# Patient Record
Sex: FEMALE | Race: WHITE
[De-identification: names, ages, dates, MRNs, and addresses within clinical notes are randomized per-mention and may not be internally consistent; named-entity substitution may affect disease eponyms.]

---

## 2019-08-03 ENCOUNTER — HOSPITAL ENCOUNTER (EMERGENCY)
Dept: HOSPITAL 97 - ER | Age: 79
LOS: 1 days | Discharge: HOME | End: 2019-08-04
Payer: COMMERCIAL

## 2019-08-03 DIAGNOSIS — G45.9: Primary | ICD-10-CM

## 2019-08-03 DIAGNOSIS — I10: ICD-10-CM

## 2019-08-03 LAB
HCT VFR BLD CALC: 36.7 % (ref 36–45)
INR BLD: 0.99
LYMPHOCYTES # SPEC AUTO: 2 K/UL (ref 0.7–4.9)
PMV BLD: 10.6 FL (ref 7.6–11.3)
RBC # BLD: 4 M/UL (ref 3.86–4.86)

## 2019-08-03 PROCEDURE — 84484 ASSAY OF TROPONIN QUANT: CPT

## 2019-08-03 PROCEDURE — 36415 COLL VENOUS BLD VENIPUNCTURE: CPT

## 2019-08-03 PROCEDURE — 70450 CT HEAD/BRAIN W/O DYE: CPT

## 2019-08-03 PROCEDURE — 85025 COMPLETE CBC W/AUTO DIFF WBC: CPT

## 2019-08-03 PROCEDURE — 99285 EMERGENCY DEPT VISIT HI MDM: CPT

## 2019-08-03 PROCEDURE — 93005 ELECTROCARDIOGRAM TRACING: CPT

## 2019-08-03 PROCEDURE — 85610 PROTHROMBIN TIME: CPT

## 2019-08-03 PROCEDURE — 80048 BASIC METABOLIC PNL TOTAL CA: CPT

## 2019-08-03 PROCEDURE — 85730 THROMBOPLASTIN TIME PARTIAL: CPT

## 2019-08-03 PROCEDURE — 83735 ASSAY OF MAGNESIUM: CPT

## 2019-08-03 PROCEDURE — 82962 GLUCOSE BLOOD TEST: CPT

## 2019-08-03 PROCEDURE — 71045 X-RAY EXAM CHEST 1 VIEW: CPT

## 2019-08-03 NOTE — XMS REPORT
Summary of Care: 19 - 19

 Created on:2117



Patient:KIM MEDINA

Sex:Female

:1940

External Reference #:[id number]





Demographics







 Address  [address] [facility]



   [place] [address]

 

 Home Phone  [phone]

 

 Email Address  [email or web address]

 

 Preferred Language  English

 

 Marital Status  

 

 Orthodox Affiliation  None

 

 Race  White

 

 Additional Race(s)  Unavailable

 

 Ethnic Group  Not  or 









Author







 Organization  [facility]

 

 Address  [address]



   [place] [address]

 

 Phone  [phone]









Encounter

HQ Saqib(FIN) [id number] Date(s): 19 - 19

[facility] [address] [place] [address]-     us [phone]

Discharge Disposition: Home or Self Care

Attending Physician: Ty Teixeira MD

Admitting Physician: Ty Teixeira MD

Referring Physician: Ty Teixeira MD





Vital Signs







 Most recent to oldest  1  2  3



 [Reference Range]:      

 

 Height  160.02 cm    



   (19 9:15 AM)    

 

 Temperature Oral [96.4-99.1  98 DegF    



 DegF]  (19 9:22 AM)    

 

 Blood Pressure [/60-90  131/79 mmHg  136/78 mmHg  127/68 mmHg



 mmHg]  (19 11:00 AM)  (19 10:45 AM)  (19 10:30 AM)

 

 Respiratory Rate [14-20  14 BRMIN  17 BRMIN  14 BRMIN



 BRMIN]  (19 11:00 AM)  (19 10:45 AM)  (19 10:30 AM)

 

 Peripheral Pulse Rate  69 bpm  72 bpm  



 [ bpm]  (19 5:51 AM)  (19 9:22 AM)  

 

 Weight  87.6 kg    



   (19 9:15 AM)    

 

 Body Mass Index  34.21 m2    



   (19 9:15 AM)    







Problem List







 Condition  Effective Dates  Status  Health Status  Informant

 

 Knee joint pain(Confirmed)  < 2/10/05  Resolved    

 

 Benign essential hypertension1  09  Active    

 

 Cerebral angiogram(Confirmed)    Active    

 

 Degenerative joint disease involving  07  Active    



 multiple joints2        

 

 Dermatitis3  11/10/08  Active    

 

 Dyslipidemia4    Active    

 

 Hypercholesterolemia(Confirmed)    Active    

 

 Hypertension(Confirmed)    Active    

 

 Brain aneurysm(Confirmed)  < 11  Resolved    

 

 Neck pain(Confirmed)    Active    

 

 Nonruptured cerebral aneurysm5    Active    

 

 Operative procedure on cerebral  11  Active    



 aneurysm(Confirmed)        

 

 Osteoarthritis(Confirmed)    Active    

 

 Overweight6  11/10/08  Active    

 

 Simple obesity(Confirmed)    Active    



1Data migrated from [employer] on 5/30/15.2Data migrated from [employer] 
on 5/30/15.3Data migrated from [employer] on 5/30/15.4Data migrated from [employer] 
[employer] on 5/30/15.5Data migrated from [employer] on 8/25/15.6Data 
migrated from [employer] on 5/30/15.



Allergies, Adverse Reactions, Alerts







 Substance  Reaction  Severity  Status

 

 NKDA      Active







Medications

1/2 NS 1,000 mL

1,000 mL, Rate: 75 ml/hr, Infuse over: 13.3 hr, Route: IV, Dosing Weight 87.6 kg
, Total Volume: 1,000, Start date: 19 10:13:00 CST, Duration: 30 day, 
Stop date: 19 10:12:00 CST, 2, m2

Start Date: 19

Stop Date: 19

Status: Discontinuedacetaminophen

1,000 mg, PO, TID, 0 Refill(s)

Start Date: 19

Status: Orderedacetaminophen

1,000 mg, 2 tab, Route: PO, Drug form: TAB, TID, Dosing Weight 87.6, kg, Start 
date: 19 13:00:00 CST, Duration: 30 day, Stop date: 19 9:00:00 CST

Notes: Max acetaminophen 4000 mg/day (4 gm/day).  (Same as: Tylenol Extra 
Strength)

Start Date: 19

Stop Date: 19

Status: Discontinuedacetaminophen

1,000 mg, 2 tab, Route: PO, Drug form: TAB, PRE OP, Start date: 19 6:00:
00 CST, Duration: 1 doses or times

Notes: Max acetaminophen 4000 mg/day (4 gm/day).  (Same as: Tylenol Extra 
Strength)

Start Date: 19

Stop Date: 19

Status: CompletedAl hydroxide/Mg hydroxide/simethicone 200 mg-200 mg-20 mg/5 mL 
oral suspension

30 mL, Route: PO, Drug Form: SUSP, Dosing Weight 87.6, kg, Q4H, PRN Indigestion
, Start date: 19 10:15:00 CST, Duration: 30 day, Stop date: 19 10:14
:00 CST

Notes: (aluminum hydroxide-magnesium hyd-simethicone [id number]/5ml 30 ml ud 
JACQUE)

Start Date: 19

Stop Date: 19

Status: DiscontinuedAncef

2 gm, 20 mL, Route: IVPB, Drug form: INJ, ONCE, Dosing Weight 87.6, kg, Start 
date: 19 10:13:00 CST, Stop date: 19 10:13:00 CST, ABX Indication: 
Surgical Prophylaxis

Notes: (Same As: Ancef)Inject direct IV slowly over 5 minutes.Cefazolin 1gm in 
sterile water 10mL

Start Date: 19

Stop Date: 19

Status: CompletedANES acetaminophen

1,000 mg, 2 tab, Route: PO, Drug form: TAB, ONCE, Dosing Weight 87.6, kg, PRN 
Pain Score 1-3, Start date: 19 9:51:00 CST

Notes: Max acetaminophen 4000 mg/day (4 gm/day).  (Same as: Tylenol Extra 
Strength)

Start Date: 19

Stop Date: 19

Status: DiscontinuedANES albuterol 0.083% inhalation solution

2.49 mg, 3 mL, Route: NEB, Drug form: SOLN, Q20Min, Dosing Weight 87.6, kg, PRN 
Wheezing, Priority: STAT, Start date: 19 9:51:00 CST, Duration: 30 day, 
Stop date: 19 9:50:00 CST

Notes: SEE RT DOCUMENTATION  (Same as: Proventil)

Start Date: 19

Stop Date: 19

Status: DiscontinuedANES diphenhydrAMINE

12.5 mg, 0.25 mL, Route: IVP, Drug form: INJ, Q6H, Dosing Weight 87.6, kg, PRN 
Itching, Start date: 19 9:51:00 CST, Duration: 30 day, Stop date:  9:50:00 CST

Notes: (Same as: Benadryl)

Start Date: 19

Stop Date: 19

Status: DiscontinuedANES flumazenil

0.2 mg, 2 mL, Route: IVP, Drug form: INJ, PRN, Dosing Weight 87.6, kg, PRN 
Benzodiazepine Reversal, Initial dose, Start date: 19 9:51:00 CST, 
Duration: 30 day, Stop date: 19 9:50:00 CST

Notes: (Same as: Romazicon)

Start Date: 19

Stop Date: 19

Status: DiscontinuedANES hydrALAZINE

10 mg, 0.5 mL, Route: IVP, Drug form: INJ, Q20Min, Dosing Weight 87.6, kg, PRN 
Elevated BP, Start date: 19 9:51:00 CST, Duration: 2 doses or times, Stop 
date: Limited # of times

Notes: (Same as: Apresoline)Push over 5 minutes

Start Date: 19

Stop Date: 19

Status: DiscontinuedANES ketOROLAC

15 mg, 0.5 mL, Route: IVP, Drug form: INJ, ONCE, Dosing Weight 87.6, kg, Start 
date: 19 9:51:00 CST, Stop date: 19 9:51:00 CST

Notes: (Same as:Toradol) IV bolus must be given &gt;15 seconds. Give IM 
administration slowly and deeply into the muscle.Not for use &gt; 4 days  *** 
MEDICATION WASTE ***Product Size:  30 mgProduct Wasted:  _15__ mg

Start Date: 19

Stop Date: 19

Status: CompletedANES meperidine

12.5 mg, 0.5 mL, Route: IVP, Drug form: INJ, Q30Min, Dosing Weight 87.6, kg, 
PRN Other -See Comment,For shivering, Start date: 19 9:51:00 CST, Duration
: 2 doses or times, Stop date: Limited # oftimes

Notes: (Same as: Demerol)  "Use Precaution in Elderly, Seizure disorders, and 
Renal impairment"

Start Date: 19

Stop Date: 19

Status: DiscontinuedANES metoprolol

1 mg, 1 mL, Route: IVP, Drug form: INJ, Q5Min, Dosing Weight 87.6, kg, PRN 
Other -See Comment, Startdate: 19 9:51:00 CST, Duration: 5 doses or times
, Stop date: Limited # of times

Notes: (Same as: Lopressor)Push over 2 minutes

Start Date: 19

Stop Date: 19

Status: DiscontinuedANES morphine Sulfate

4 mg, 1 mL, Route: IVP, Drug form: SOLN, Q5Min, Dosing Weight 87.6, kg, PRN 
Pain Score 7-10, Start date: 19 9:51:00 CST, Duration: 3 doses or times, 
Stop date: 19 22:00:00 CST

Notes: (Same as:MORPhine Sulfate)

Start Date: 19

Stop Date: 19

Status: DiscontinuedANES morphine Sulfate

2 mg, 1 mL, Route: IVP, Drug form: SOLN, Q5Min, Dosing Weight 87.6, kg, PRN 
Pain Score 4-6, Start date: 19 9:51:00 CST, Duration: 5 doses or times, 
Stop date: Limited # of times

Start Date: 19

Stop Date: 19

Status: DiscontinuedANES naloxone

0.4 mg, 1 mL, Route: IVP, Drug form: INJ, Q2MIN, Dosing Weight 87.6, kg, PRN 
Narcotic Reversal, Start date: 19 9:51:00 CST, Duration: 8 doses or times
, Stop date: Limited # of times

Notes: Same as Narcan

Start Date: 19

Stop Date: 19

Status: DiscontinuedANES ondansetron

4 mg, 2 mL, Route: IVP, Drug form: INJ, ONCE, Dosing Weight 87.6, kg, PRN 
Nausea &amp; Vomiting, Start date: 19 9:51:00 CST

Notes: (Same as: Zofran)  *** MEDICATION WASTE ***Product Size:  4 mgProduct 
Wasted:  ___ mg

Start Date: 19

Stop Date: 19

Status: DiscontinuedANES promethazine

12.5 mg, 0.5 mL, Route: IM, Drug form: INJ, ONCE, Dosing Weight 87.6, kg, PRN 
Nausea &amp; Vomiting,Start date: 19 9:51:00 CST

Notes: Do not give IV push.  (Same as: Phenergan)

Start Date: 19

Stop Date: 19

Status: Discontinuedaspirin 325 mg tablet, enteric coated

325 mg, 1 tab, Route: PO, Drug form: ECTAB, Daily, Dosing Weight 87.6, kg, 
Start date: 19 9:00:00 CST, Duration: 30 day, Stop date: 19 9:00:00 
CST

Notes: (Do Not Crush)  Do not crush or chew.

Start Date: 19

Stop Date: 19

Status: Canceledaspirin 325 mg tablet, enteric coated

325 mg, PO, Daily, 0 Refill(s)

Start Date: 19

Status: Orderedbupivacaine (ANES)

Route: INTRATHECAL, Drug Form: INJ, ONCE, Stop date: 19 7:54:00 CST

Start Date: 19

Stop Date: 19

Status: CompletedBystolic 20 mg oral tablet

20 mg=1 tab, PO, Daily, 0 Refill(s)

Start Date: 19

Status: OrderedceFAZolin

2 gm, 20 mL, Route: IV, Drug form: INJ, PRE OP, Start date: 19 6:00:00 CST
, Duration: 1 doses or times, ABX Indication: Surgical Prophylaxis

Notes: (Same As: Ancef)Inject direct IV slowly over 5 minutes.Cefazolin 1gm in 
sterile water 10mL

Start Date: 19

Stop Date: 19

Status: DiscontinuedceFAZolin (ANES)

Route: IV, Drug form: INJ, ONCE, Stop date: 19 7:59:00 CST

Start Date: 19

Stop Date: 19

Status: CompletedCeleBREX

400 mg, 4 cap, Route: PO, Drug form: CAP, PRE OP, Start date: 19 6:00:00 
CST, Duration: 1 doses or times

Notes: NSAID. Please check indication. Not for seizure. (Same As: CeleBREX)

Start Date: 19

Stop Date: 19

Status: Completedcelecoxib

200 mg, 2 cap, Route: PO, Drug form: CAP, BID, Dosing Weight 87.6, kg, Start 
date: 19 17:00:00CST, Duration: 30 day, Stop date: 19 9:00:00 CST

Notes: NSAID. Please check indication. Not for seizure. (Same As: CeleBREX)

Start Date: 19

Stop Date: 19

Status: Canceledcelecoxib 200 mg oral capsule

200 mg, PO, BID, # 60 caplet, 1 Refill(s), Pharmacy: [facility] [id number]

Start Date: 19

Status: Ordereddexamethasone (ANES)

Route: IV, Drug form: INJ, ONCE, Stop date: 19 8:54:00 CST

Start Date: 19

Stop Date: 19

Status: CompletedePHEDrine (ANES)

Route: IV, Drug form: INJ, ONCE, Stop date: 19 8:24:00 CST

Start Date: 19

Stop Date: 19

Status: Completedfamotidine (ANES)

Route: IV, Drug form: INJ, ONCE, Stop date: 19 8:54:00 CST

Start Date: 19

Stop Date: 19

Status: Completedglycopyrrolate (ANES)

Route: IV, Drug form: INJ, ONCE, Stop date: 19 8:54:00 CST

Start Date: 19

Stop Date: 19

Status: Completedhydrochlorothiazide-losartan 25 mg-100 mg oral tablet

1 tab, PO, Daily, 0 Refill(s)

Start Date: 19

Status: OrderedketOROLAC 30 mg/mL injectable solution

15 mg, 0.5 mL, Route: IVP, Drug form: INJ, Q6H, Dosing Weight 87.6, kg, PRN 
Pain Score 7-10, Start date: 19 10:15:00 CST, Duration: 1 doses or times, 
Stop date: Limited # of times

Notes: (Same as:Toradol) IV bolus must be given &gt;15 seconds. Give IM 
administration slowly and deeply into the muscle.Not for use &gt; 4 days  *** 
MEDICATION WASTE ***Product Size:  30 mgProduct Wasted:  ___ mg

Start Date: 19

Stop Date: 19

Status: DiscontinuedLactated Ringers Injection IV (ANES) 1000 mL

Route: IV, Total Volume: 1,000, Start date: 19 7:12:00 CST, Stop date:  8:12:00 CST

Start Date: 19

Stop Date: 19

Status: CompletedLactated Ringers Injection IV 1,000 mL

1,000 mL, Rate: 25 ml/hr, Infuse over: 40 hr, Route: IV, Dosing Weight 87.6 kg, 
Total Volume: 1,000,Start date: 19 5:45:00 CST, Duration: 30 day, Stop 
date: 19 5:44:00 CST, 2, m2

Start Date: 19

Stop Date: 19

Status: DiscontinuedLyrica

75 mg, 1 cap, Route: PO, Drug form: CAP, PRE OP, Start date: 19 6:00:00 
CST, Duration: 1 dosesor times

Notes: (Same as: Lyrica)

Start Date: 19

Stop Date: 19

Status: Completedmethylergonovine (ANES)

Route: IM, Drug form: INJ, ONCE, Stop date: 19 8:29:00 CST

Start Date: 19

Stop Date: 19

Status: Completedmidazolam (ANES)

Route: IV, Drug form: SOLN, ONCE, Stop date: 19 7:59:00 CST

Start Date: 19

Stop Date: 19

Status: Completedmorphine Sulfate

2 mg, 1 mL, Route: IVP, Drug form: SOLN, Q4H, Dosing Weight 87.6, kg, PRN Pain 
Score 7-10, Start date: 19 10:15:00 CST, Stop date: 19 22:00:00 CST

Start Date: 19

Stop Date: 19

Status: Discontinuedondansetron

4 mg, Route: PO, Q8H, Dosing Weight 87.6, kg, Start date: 19 16:00:00 CST
, Duration: 30 day, Stop date: 19 8:00:00 CST

Start Date: 19

Stop Date: 19

Status: Deletedondansetron (ANES)

Route: IV, Drug form: INJ, ONCE, Stop date: 19 8:54:00 CST

Start Date: 19

Stop Date: 19

Status: CompletedoxyCODONE 5 mg immediate release

5 mg, 1 tab, Route: PO, Drug form: TAB, Q4H, Dosing Weight 87.6, kg, PRN Pain 
Score 4-6, Start date:19 10:15:00 CST, Duration: 30 day, Stop date:  10:14:00 CST

Notes: (Same as: Roxicodone)

Start Date: 19

Stop Date: 19

Status: Discontinuedphenylephrine (ANES)

Route: IV, Drug form: INJ, ONCE, Stop date: 19 8:49:00 CST

Start Date: 19

Stop Date: 19

Status: Completedpropofol (ANES) 10 mg

Route: IV, Drug form: INJ, Start date: 19 7:30:00 CST, Stop date:  8:30:00 CST

Start Date: 19

Stop Date: 19

Status: CompletedSodium Chloride 0.9% IV (ANES) 1000 mL

Route: IV, Total Volume: 1,000, Start date: 19 9:04:00 CST, Stop date:  10:04:00 CST

Start Date: 19

Stop Date: 19

Status: CompletedSodium Chloride 0.9% IV (ANES) 50 mL

Route: IV, Total Volume: 50, Start date: 19 9:27:00 CST, Stop date:  10:27:00 CST

Start Date: 19

Stop Date: 19

Status: Completedtramadol 50 mg oral tablet

100 mg=2 tab, PO, Q6H, # 100 tab, 0 Refill(s)

Start Date: 19

Stop Date: 19

Status: Orderedtramadol 50 mg oral tablet

100 mg, 2 tab, Route: PO, Drug form: TAB, Q6H, Dosing Weight 87.6, kg, Start 
date: 19 12:00:00CST, Duration: 30 day, Stop date: 19 6:00:00 CST

Notes: Not to exceed 400mg/day. (Same As: Ultram)

Start Date: 19

Stop Date: 19

Status: Discontinuedtranexamic acid (ANES)

Route: IV, Drug form: INJ, ONCE, Stop date: 19 8:24:00 CST

Start Date: 19

Stop Date: 19

Status: CompletedVitamin D2

PO, 0 Refill(s)

Start Date: 19

Stop Date: 19

Status: CompletedVitamin D2 50,000 intl units oral capsule

50,000 IntlUnit=1 cap, PO, Daily, 0 Refill(s)

Start Date: 19

Status: OrderedZofran

4 mg, 1 tab, Route: PO, Drug form: TAB, Q8H, Dosing Weight 87.6, kg, Start date
: 19 16:00:00 CST, Duration: 30 day, Stop date: 19 8:00:00 CST

Notes: (Same as: Zofran)

Start Date: 19

Stop Date: 19

Status: Canceled



Results

ELECTROLYTES





 Most recent to oldest [Reference Range]:  1

 

 Sodium Lvl [135-145 mEq/L]  137 mEq/L



   (19 10:03 AM)

 

 Potassium Lvl [3.5-5.1 mEq/L]  3.5 mEq/L



   (19 10:03 AM)

 

 Chloride Lvl [ mEq/L]  102 mEq/L



   (19 10:03 AM)

 

 CO2 [24-32 mEq/L]  28 mEq/L



   (19 10:03 AM)

 

 AGAP [10.0-20.0 mEq/L]  10.5 mEq/L



   (19 10:03 AM)



CHEM PANEL





 Most recent to oldest [Reference Range]:  1

 

 Creatinine Lvl [0.50-1.40 mg/dL]  1.00 mg/dL



   (19 10:03 AM)

 

 eGFR  54 mL/min/1.73m2 1



   *NA*



   (19 10:03 AM)

 

 BUN [7-22 mg/dL]  22 mg/dL



   (19 10:03 AM)

 

 Glucose Lvl [70-99 mg/dL]  104 mg/dL



   *HI*



   (19 10:03 AM)

 

 Albumin Lvl [3.5-5.0 g/dL]  4.0 g/dL



   (19 10:03 AM)

 

 Calcium Lvl [8.5-10.5 mg/dL]  9.5 mg/dL



   (19 10:03 AM)



1Result Comment: The eGFR is calculated using the CKD-EPI formula. In most young
, healthy individualsthe eGFR will be &gt;90 mL/min/1.73m2. The eGFR declines 
with age. An eGFR of 60-89 may be normal insome populations, particularly the 
elderly, for whom the CKD-EPI formula has not been extensively validated. Use 
of the eGFR is not recommended in the following populations:



Individuals with unstable creatinine concentrations, including pregnant 
patients and those with serious co-morbid conditions.



Patients with extremes in muscle mass or diet.



The data above are obtained from the National Kidney Disease Education Program (
NKDEP) which additionally recommends that when the eGFR is used in patients 
with extremes of body mass index for purposesof drug dosing, the eGFR should be 
multiplied by the estimated BMI.URINE CHEM





 Most recent to oldest [Reference Range]:  1

 

 U Cotinine Lvl [Negative]  Negative



   (19 10:03 AM)



HEMATOLOGY





 Most recent to oldest [Reference Range]:  1

 

 WBC [3.7-10.4 K/CMM]  6.9 K/CMM



   (1/7/19 10:03 AM)

 

 RBC [4.20-5.40 M/CMM]  4.28 M/CMM



   (19 10:03 AM)

 

 Hgb [12.0-16.0 g/dL]  13.0 g/dL



   (19 10:03 AM)

 

 Hct [36.0-48.0 %]  39.8 %



   (19 10:03 AM)

 

 MCV [80.0-98.0 fL]  93.0 fL



   (19 10:03 AM)

 

 MCH [27.0-31.0 pg]  30.4 pg



   (19 10:03 AM)

 

 MCHC [32.0-36.0 g/dL]  32.7 g/dL



   (19 10:03 AM)

 

 RDW [11.5-14.5 %]  13.3 %



   (19 10:03 AM)

 

 MPV [7.4-10.4 fL]  9.7 fL



   (19 10:03 AM)

 

 Platelet [133-450 K/CMM]  254 K/CMM



   (19 10:03 AM)

 

 Segs [45.0-75.0 %]  65.6 %



   (19 10:03 AM)

 

 Lymphocytes [20.0-40.0 %]  22.4 %



   (19 10:03 AM)

 

 Monocytes [2.0-12.0 %]  9.0 %



   (19 10:03 AM)

 

 Eosinophils [0.0-4.0 %]  2.4 %



   (19 10:03 AM)

 

 Basophils [0.0-1.0 %]  0.6 %



   (19 10:03 AM)

 

 Neutrophils # [1.5-8.1 K/CMM]  4.6 K/CMM



   (19 10:03 AM)

 

 Lymphocytes # [1.0-5.5 K/CMM]  1.6 K/CMM



   (19 10:03 AM)

 

 Monocytes # [0.0-0.8 K/CMM]  0.6 K/CMM



   (19 10:03 AM)

 

 Eosinophils # [0.0-0.5 K/CMM]  0.2 K/CMM



   (19 10:03 AM)







Immunizations

No data available for this section



Procedures







 Procedure  Date  Related Diagnosis  Body Site  Status

 

 Brain operations1  5/10/11      Completed

 

 Carpal tunnel release        Completed

 

 Cataract surgery        Completed

 

 Cholecystectomy        Completed

 

 Knee arthroplasty        Completed



1ANURYSM



Social History







 Social History Type  Response

 

 Substance Abuse  Use: None.

 

 Alcohol  Current, Type Wine.  Frequency: Daily.

 

 Smoking Status  Former smoker; Type: Cigarettes; Exposure to Tobacco Smoke None
; Cigarette Smoking Last 365 Days No; Reg Smoking Cessation Counseling No; 
Started at age: 19.0; Stopped at age: 39;



   entered on: 19







Assessment and Plan

No data available for this section

## 2019-08-03 NOTE — XMS REPORT
Encounter??CCD:??2011??to??2011

 Created on:2113



Patient:KIM MEDINA

Sex:Female

:1940

External Reference #:[id number]





Demographics







 Address  [address] [place] [address]

 

 Home Phone  [phone]

 

 Preferred Language  Unknown

 

 Marital Status  Unknown

 

 [employer] Affiliation  Unknown

 

 Race  Unknown

 

 Ethnic Group  Unknown









Author







 Organization  [facility]









Care Team Providers







 Name  Role  Phone

 

 ROPUser, MH Lab  Consulting Provider  Unavailable

 

 Myesha Garner  Consulting Provider  [phone]

 

 Aissatou Gloria  Consulting Provider  Unavailable

 

 Jose Antonio Oropeza  Consulting Provider  Unavailable

 

 Sher Carpenter  Consulting Provider  Unavailable

 

 Tanvi Bauer  Consulting Provider  [phone]

 

 Juan Mathews  Consulting Provider  Unavailable

 

 Lurdes Najera  Consulting Provider  Unavailable

 

 Marleny Disla  Consulting Provider  Unavailable

 

 Marylou De La Cruz  Consulting Provider  Unavailable

 

 Marge Melton  Consulting Provider  Unavailable

 

 Telma Miller  Consulting Provider  Unavailable

 

 Dianna Farias  Consulting Provider  [phone]

 

 Alicia Nielson  Consulting Provider  [phone]

 

 Montez Begum  Consulting Provider  Unavailable

 

 Loretta Zaragoza  Consulting Provider  [phone]

 

 Michele Conway  Consulting Provider  Unavailable

 

 Nery López  Consulting Provider  Unavailable

 

 Bebe Madera  Consulting Provider  Unavailable

 

 Qasim Lilly  Consulting Provider  Unavailable

 

 Sandra Wang  Consulting Provider  [phone]

 

 Lorraine Bullard  Consulting Provider  Unavailable

 

 Ho, Sophie Phi My  Consulting Provider  [phone]

 

 Pretty Chowdhury  Consulting Provider  Unavailable

 

 Fatemeh Elizalde  Consulting Provider  Unavailable

 

 Mone Vazquez  Consulting Provider  [phone]

 

 Alexis San  Consulting Provider  [phone]

 

 Celso Hernandez  Consulting Provider  Unavailable

 

 Victorino Whitt  Consulting Provider  Unavailable

 

 Laron Goddard  Consulting Provider  Unavailable

 

 Kita Guzman  Consulting Provider  Unavailable

 

 SYSTEM, SYSTEM  Consulting Provider  Unavailable

 

 Carly Jeffery  Consulting Provider  [phone]

 

 Carroll Oneal  Consulting Provider  Unavailable

 

 Maru Rivera  Consulting Provider  Unavailable

 

 Cristine Lawton  Consulting Provider  Unavailable

 

 Candy Granados  Consulting Provider  Unavailable

 

 Sophie Knight  Consulting Provider  [phone]

 

 Aristeo Dominguez Jr  Consulting Provider  Unavailable

 

 Doris Pryor  Consulting Provider  Unavailable

 

 Deann Luis  Consulting Provider  Unavailable

 

 Raulito Kumar  Referring Provider  [phone]

 

 Ryan Clay ([facility]) AUGUSTIN  Consulting Provider  [phone]

 

 Krish Díaz  Consulting Provider  Unavailable

 

 Payal Butt  Consulting Provider  [phone]

 

 Trisha Dias  Consulting Provider  [phone]

 

 Bryon Solorio  Consulting Provider  Unavailable









Allergies, Adverse Reactions, Alerts







 Substance  Reaction  Status

 

 NKDA  ??  Active







Problem List







 Condition  Effective Dates  Status

 

 Operative procedure on cerebral aneurysm  2011  Active







Medications







 Medication  Instructions  Start Date  End Date  Status

 

 Lactated Ringers IV 1,000  1,000 mL, Rate: 40  2011  
Discontinued



 mL  ml/hr, Infuse over: 25      



   hr, Route: IV, Total      



   Volume: 1,000, Start      



   date: 11 8:13:00,      



   Duration: 30 day, Stop      



   date: 11 8:12:00      

 

 Exforge 10 mg-320 mg oral  1 tab, Route: PO, Daily,  2011  
Deleted



 tablet  Start date: 11      



   9:00:00, Duration: 30      



   day, Stop date: 11      



   9:00:00      

 

 Diovan  320 mg, 2 tab, Route:  2011  Discontinued



   PO, Drug form: TAB,      



   Daily, Start date:      



   11 9:00:00,      



   Duration: 30 day, Stop      



   date: 11 9:00:00      

 

 labetalol  10 mg, 2 mL, Route: IVP,  2011  Discontinued



   Drug form: INJ, Q15Min,      



   PRN Hypertension, Start      



   date: 11 16:03:00,      



   Duration: 30 day, Stop      



   date: 11 16:02:00      

 

 normal saline 0.9% IV  1,000 mL, Rate: 75  2011  Discontinued



 1,000 mL  ml/hr, Infuse over: 13.3      



   hr, Route: IV, Total      



   Volume: 1,000, Start      



   date: 11 16:02:00,      



   Duration: 30 day, Stop      



   date: 11 16:01:00      

 

 Norvasc  10 mg, 1 tab, Route: PO,  2011  Discontinued



   Drug form: TAB, Daily,      



   Start date: 11      



   9:00:00, Duration: 30      



   day, Stop date: 11      



   9:00:00      

 

 Vytorin 10/20  1 tab, Route: PO, Daily,  2011  Deleted



   Start date: 11      



   9:00:00, Duration: 30      



   day, Stop date: 11      



   9:00:00      

 

 omega-3 polyunsaturated  2, PO, BID, Substitution  2011  ??  Ordered



 fatty acids 1000 mg oral  Allowed      



 capsule        

 

 zolpidem 5 mg oral tablet  1 tab, PO, PRN, Sleep,  2011  ??  Ordered



   Substitution Allowed      

 

 MaxEPA  2 gm, 2 cap, Route: PO,  2011  Discontinued



   Drug form: CAP, BID,      



   Start date: 11      



   11:00:00, Duration: 30      



   day, Stop date: 11      



   9:00:00      

 

 labetalol  5 mg, 1 mL, Route: IVP,  2011  Discontinued



   Drug form: INJ, Q5Min,      



   PRN Elevated BP, Start      



   date: 11 12:50:00,      



   Duration: 5 doses or      



   times, Stop date:      



   11 22:00:00      

 

 hydrALAZINE  5 mg, 0.25 mL, Route:  2011  Discontinued



   IVP, Drug form: INJ,      



   Q5Min, PRN Elevated BP,      



   Start date: 11      



   12:50:00, Duration: 4      



   doses or times, Stop      



   date: Limited # of times      

 

 ondansetron  4 mg, 2 mL, Route: IVP,  2011  Discontinued



   Drug form: INJ, ONCE,      



   Start date: 11      



   12:50:00, Stop date:      



   11 12:50:00      

 

 promethazine  6.25 mg, 0.25 mL, Route:  2011  Discontinued



   IVPB, Drug form: INJ,      



   ONCE, Start date:      



   11 12:50:00, Stop      



   date: 11 12:50:00      

 

 acetaminophen-hydrocodone  2 tab, Route: PO, Drug  2011  
Discontinued



 325 mg-5 mg oral tablet  Form: TAB, Q4H, PRN Pain      



   Score 4-6, Start date:      



   11 12:50:00,      



   Duration: 30 day, Stop      



   date: 11 12:49:00      

 

 meperidine  12.5 mg, 0.5 mL, Route:  2011  Discontinued



   IVP, Drug form: INJ,      



   Q30Min, PRN Other -See      



   Comment, For shivering,      



   Start date: 11      



   12:50:00, Duration: 2      



   doses or times, Stop      



   date: 11 0:00:00      

 

 hydromorphone  0.5 mg, 0.25 mL, Route:  2011  Discontinued



   IVP, Drug form: INJ,      



   Q5Min, PRN Pain Score      



   4-6, Start date:      



   11 12:50:00,      



   Duration: 5 doses or      



   times, Stop date:      



   11 21:00:00      

 

 morphine Sulfate  2 mg, 1 mL, Route: IVP,  2011  Discontinued



   Drug form: INJ, Q5Min,      



   PRN Pain Score 4-6,      



   Start date: 11      



   12:50:00, Duration: 8      



   doses or times, Stop      



   date: 11 0:00:00      

 

 Zocor  20 mg, 1 tab, Route: PO,  2011  Canceled



   Drug form: TAB, Bedtime,      



   Start date: 11      



   21:00:00, Duration: 30      



   day, Stop date: 11      



   21:00:00      

 

 omega-3 polyunsaturated  Route: PO, Drug Form:  2011  
Discontinued



 fatty acids with Vitamin B  CAP, BID, Start date:      



 Complex oral capsule  11 9:00:00,      



   Duration: 30 day, Stop      



   date: 11 17:00:00      

 

 Zetia  10 mg, 1 tab, Route: PO,  2011  Canceled



   Drug form: TAB, Bedtime,      



   Start date: 11      



   21:00:00, Duration: 30      



   day, Stop date: 11      



   21:00:00      

 

 midazolam  1 mg, Route: IV, ONCE,  2011  Completed



   Start date: 11      



   13:40:00, Stop date:      



   11 13:40:00      

 

 Zofran  4 mg, 2 mL, Route: IVP,  2011  Discontinued



   Drug form: INJ, Q8H, PRN      



   Nausea, Start date:      



   11 17:16:00,      



   Duration: 30 day, Stop      



   date: 11 17:15:00      

 

 Exforge HCT 10 mg-320  1 tab, PO, Daily, 30  2011  ??  Ordered



 mg-25 mg oral tablet  tab, Substitution      



   Allowed, Maintenance,      



   TAB      

 

 Vytorin 10 mg-20 mg oral  1 tab, PO, Daily, 30  2011  ??  Ordered



 tablet  tab, Substitution      



   Allowed, Maintenance,      



   TAB      

 

 docusate sodium 100 mg  100 mg, 1 cap, PO, Q12H,  2011  ??  Ordered



 oral capsule  40 cap, Substitution      



   Allowed, CAP      

 

 acetaminophen-hydrocodone  2 tab, PO, Q4H, PRN, 30 tab, Pain Score 1-3, 
Substitution Allowed, Maintenance, (not to exceed 4000 mg acetaminophen per day)
, TAB  2011  ??  Ordered



 325 mg-5 mg oral tablet  (not to exceed 4000 mg acetaminophen per day)      

 

 docusate  100 mg, 1 cap, Route:  2011  Discontinued



   PO, Drug form: CAP,      



   Q12H, Start date:      



   11 21:00:00,      



   Duration: 30 day, Stop      



   date: 11 9:00:00      

 

 acetaminophen-hydrocodone  1 tab, Route: PO, Drug  2011  
Discontinued



 325 mg-5 mg oral tablet  Form: TAB, Q4H, PRN Pain      



   Score 1-3, Start date:      



   11 13:53:00,      



   Duration: 30 day, Stop      



   date: 11 13:52:00      

 

 Dilaudid  0.5 mg, Route: IV, ONCE,  2011  Completed



   PRN Pain, Start date:      



   11 12:39:00      

 

 famotidine 20 mg oral  20 mg, 1 tab, Route: PO,  2011  
Discontinued



 tablet  Drug form: TAB, BID,      



   Start date: 11      



   17:00:00, Duration: 30      



   day, Stop date: 11      



   9:00:00      







Vital Signs







 Most recent to oldest  1  2  3



 [Reference Range]:      

 

 Height  160.02 cm  162.56 cm  ??



   (2011 08:06:00) ??  (2011 14:11:00) ??  

 

 Temperature Oral  99.0 DegF  97.5 DegF  97.4 DegF



 [96.4-99.1 DegF]  (2011 07:00:00) ??  (2011 03:00:00) ??  (2011 23:00:00) ??

 

 Systolic Blood Pressure  95 mmHg  105 mmHg  104 mmHg



 [ mmHg]  (2011 09:00:00) ??  (2011 08:00:00) ??  (2011 
07:00:00) ??

 

 Diastolic Blood Pressure  48 mmHg  51 mmHg  56 mmHg



 [60-90 mmHg]  *LOW*  *LOW*  *LOW*



   (2011 09:00:00) ??  (2011 08:00:00) ??  (2011 07:00:00) ??

 

 Respiratory Rate [14-20  13 BRMIN  18 BRMIN  12 BRMIN



 BRMIN]  *LOW*  (2011 08:00:00) ??  *LOW*



   (2011 09:00:00) ??    (2011 07:00:00) ??

 

 Peripheral Pulse Rate  94 bpm  ??  ??



 [ bpm]  (2011 08:05:00) ??    

 

 Weight  79.545 kg  79.545 kg  ??



   (2011 08:06:00) ??  (2011 14:11:00) ??  







Results

BACTERIAL - SEROLOGY





 Most recent to oldest [Reference Range]:  1  2  3

 

 MRSA by PCR  Negative 1  ??  ??



   (2011 15:00:00) ??    



1Interpretive Data: INTERPRETATION:    Negative......No MRSA DNA detected by 
PCR     Positive......MRSA DNA detected by PCRASSAY LIMITATIONS:This is a 
screening test for colonization by MRSA. A positivetest result indicates the 
patient is colonized by MRSA, but does not necessarily mean that an infection 
is present or that treatment is necessary.  Likewise, a negative test does not 
exclude colonization or infection.  Patients should be evaluatedclinically for 
symptoms and signs of infection before making therapeutic decisions.  Routine 
decolonization is discouraged and should only be considered forselect patients 
after consultation with an infectious diseases specialist.BEDSIDE GLUCOSE 
TESTING





 Most recent to   1  2  3



 [Reference Range]:      

 

 Gluc POC Lifscn [  96 mg/dL 2  117 mg/dL 3  89 mg/dL 4



 mg/dL]  (2011 07:38:00) ??  *HI*  (2011 00:12:00) ??



     (2011 03:45:00) ??  

 

 Comment1  Notify RN/MD  Notify RN/MD  Notify RN/MD



   *NA*  *NA*  *NA*



   (2011 03:45:00) ??  (2011 00:12:00) ??  (2011 19:46:00) ??



2Interpretive Data:  Upper Reportable Limit: 200 mg/dL.3Interpretive 
Data:  Upper Reportable Limit: 200 mg/dL.4Interpretive Data:  
Upper Reportable Limit: 200 mg/dL.BLOOD BANK RESULTS





 Most recent to  [Reference Range]:  1  2  3

 

 ABO/Rh  O POS  ??  ??



   *Unknown*    



   (2011 14:35:00) ??    

 

 Antibody Scrn  Negative  ??  ??



   (2011 14:35:00) ??    



CHEMISTRY





 Most recent to oldest [Reference  1  2  3



 Range]:      

 

 Sodium Lvl [135-145 mEq/L]  138 mEq/L  141 mEq/L  ??



   (2011 02:29:00) ??  (2011 13:00:00) ??  

 

 Potassium Lvl [3.5-5.1 mEq/L]  4.2 mEq/L  3.9 mEq/L  ??



   (2011 02:29:00) ??  (2011 13:00:00) ??  

 

 Chloride Lvl [ mEq/L]  105 mEq/L  102 mEq/L  ??



   (2011 02:29:00) ??  (2011 13:00:00) ??  

 

 CO2 [24-32 mEq/L]  23 mEq/L  26 mEq/L  ??



   *LOW*  (2011 13:00:00) ??  



   (2011 02:29:00) ??    

 

 AGAP [10.0-20.0 mEq/L]  14.2 mEq/L  16.9 mEq/L  ??



   (2011 02:29:00) ??  (2011 13:00:00) ??  

 

 Creatinine Lvl [0.5-1.4 mg/dL]  0.8 mg/dL  0.7 mg/dL  ??



   (2011 02:29:00) ??  (2011 13:00:00) ??  

 

 BUN [7-22 mg/dL]  12 mg/dL  20 mg/dL  ??



   (2011 02:29:00) ??  (2011 13:00:00) ??  

 

 Glucose Lvl  101 mg/dL 5  90 mg/dL 6  ??



   *NA*  *NA*  



   (2011 02:29:00) ??  (2011 13:00:00) ??  

 

 Calcium Lvl [8.5-10.5 mg/dL]  8.3 mg/dL  10.1 mg/dL  ??



   *LOW*  (2011 13:00:00) ??  



   (2011 02:29:00) ??    

 

 Phosphorus [2.5-4.5 mg/dL]  3.4 mg/dL  3.2 mg/dL  ??



   (2011 02:29:00) ??  (2011 16:20:00) ??  

 

 Magnesium Lvl [1.8-2.4 mg/dL]  2.0 mg/dL  1.9 mg/dL  ??



   (2011 02:29:00) ??  (2011 16:20:00) ??  

 

 Ca Ion mgdL [4.65-5.20 mg/dL]  4.80 mg/dL  4.32 mg/dL  ??



   (2011 02:29:00) ??  *LOW*  



     (2011 16:20:00) ??  

 

 Ca Ion [1.16-1.30 mMol/L]  1.20 mMol/L  1.08 mMol/L  ??



   (2011 02:29:00) ??  *LOW*  



     (2011 16:20:00) ??  

 

 Ca Norm [1.16-1.30 mMol/L]  1.21 mMol/L  1.05 mMol/L  ??



   (2011 02:29:00) ??  *LOW*  



     (2011 16:20:00) ??  

 

 Ca Norm mgdL [4.65-5.20 mg/dL]  4.84 mg/dL  4.20 mg/dL  ??



   (2011 02:29:00) ??  *LOW*  



     (2011 16:20:00) ??  



5Interpretive Data: Reference Ranges :     0 - 7 days  : 41 - 90 mg/dL7 days - 
150 yrs : 70 - 99 mg/dL (fasting), based on the clinical recommendations of the 
American Diabetes Association.6Interpretive Data: Reference Ranges :     0 - 7 
days  : 41 - 90 mg/dL7 days - 150 yrs : 70 - 99 mg/dL (fasting), based on the 
clinical recommendations of the American Diabetes Association.HEMATOLOGY





 Most recent to oldest  1  2  3



 [Reference Range]:      

 

 WBC [3.7-10.4 K/CMM]  8.6 K/CMM  11.2 K/CMM  7.0 K/CMM



   (2011 02:29:00) ??  *HI*  (2011 13:00:00) ??



     (2011 16:20:00) ??  

 

 RBC [4.20-5.40 M/CMM]  3.58 M/CMM  3.76 M/CMM  4.29 M/CMM



   *LOW*  *LOW*  (2011 13:00:00) ??



   (2011 02:29:00) ??  (2011 16:20:00) ??  

 

 Hgb [12.0-16.0 g/dL]  11.2 g/dL  11.7 g/dL  13.4 g/dL



   *LOW*  *LOW*  (2011 13:00:00) ??



   (2011 02:29:00) ??  (2011 16:20:00) ??  

 

 Hct [36.0-48.0 %]  32.9 %  34.9 %  40.0 %



   *LOW*  *LOW*  (2011 13:00:00) ??



   (2011 02:29:00) ??  (2011 16:20:00) ??  

 

 MCV [81.0-99.0 fL]  92.0 fL  92.7 fL  93.1 fL



   (2011 02:29:00) ??  (2011 16:20:00) ??  (2011 13:00:00) ??

 

 MCH [27.0-31.0 pg]  31.4 pg  31.1 pg  31.3 pg



   *HI*  *HI*  *HI*



   (2011 02:29:00) ??  (2011 16:20:00) ??  (2011 13:00:00) ??

 

 MCHC [32.0-36.0 g/dL]  34.2 g/dL  33.5 g/dL  33.6 g/dL



   (2011 02:29:00) ??  (2011 16:20:00) ??  (2011 13:00:00) ??

 

 RDW [11.5-14.5 %]  12.7 %  12.9 %  12.9 %



   (2011 02:29:00) ??  (2011 16:20:00) ??  (2011 13:00:00) ??

 

 Platelet [133-450 K/CMM]  209 K/CMM  200 K/CMM  235 K/CMM



   (2011 02:29:00) ??  (2011 16:20:00) ??  (2011 13:00:00) ??

 

 MPV [7.4-10.4 fL]  9.1 fL  9.4 fL  9.6 fL



   (2011 02:29:00) ??  (2011 16:20:00) ??  (2011 13:00:00) ??

 

 Segs [45.0-75.0 %]  78.3 %  83.3 %  64.8 %



   *HI*  *HI*  (2011 13:00:00) ??



   (2011 02:29:00) ??  (2011 16:20:00) ??  

 

 Lymphocytes [20.0-40.0 %]  14.9 %  11.5 %  27.4 %



   *LOW*  *LOW*  (2011 13:00:00) ??



   (2011 02:29:00) ??  (2011 16:20:00) ??  

 

 Monocytes [2.0-12.0 %]  5.5 %  4.6 %  5.7 %



   (2011 02:29:00) ??  (2011 16:20:00) ??  (2011 13:00:00) ??

 

 Eosinophils [0.0-4.0 %]  0.9 %  0.3 %  1.5 %



   (2011 02:29:00) ??  (2011 16:20:00) ??  (2011 13:00:00) ??

 

 Basophils [0.0-1.0 %]  0.4 %  0.3 %  0.6 %



   (2011 02:29:00) ??  (2011 16:20:00) ??  (2011 13:00:00) ??

 

 Segs-Bands # [1.5-8.1  6.7 K/CMM  9.4 K/CMM  4.5 K/CMM



 K/CMM]  (2011 02:29:00) ??  *HI*  (2011 13:00:00) ??



     (2011 16:20:00) ??  

 

 Lymphocytes # [1.0-5.5  1.3 K/CMM  1.3 K/CMM  1.9 K/CMM



 K/CMM]  (2011 02:29:00) ??  (2011 16:20:00) ??  (2011 13:00:
00) ??

 

 Monocytes # [0.0-0.8  0.5 K/CMM  0.5 K/CMM  0.4 K/CMM



 K/CMM]  (2011 02:29:00) ??  (2011 16:20:00) ??  (2011 13:00:
00) ??

 

 Eosinophils # [0.0-0.5  0.1 K/CMM  0.0 K/CMM  0.1 K/CMM



 K/CMM]  (2011 02:29:00) ??  (2011 16:20:00) ??  (2011 13:00:
00) ??

 

 Basophils # [0.0-0.2  0.0 K/CMM  0.0 K/CMM  0.0 K/CMM



 K/CMM]  (2011 02:29:00) ??  (2011 16:20:00) ??  (2011 13:00:
00) ??

 

 RBC Morph  Normal  ??  ??



   (2011 16:20:00) ??    

 

 Plt Morph  Normal  ??  ??



   (2011 16:20:00) ??    

 

 PT [12.0-14.7 seconds]  13.9 seconds  13.5 seconds  13.2 seconds



   (2011 02:29:00) ??  (2011 16:20:00) ??  (2011 13:00:00) ??

 

 INR [0.85-1.17]  1.07 7  1.03 8  1.00 9



   (2011 02:29:00) ??  (2011 16:20:00) ??  (2011 13:00:00) ??

 

 PTT [22.9-35.8 seconds]  31.3 seconds 10  29.8 seconds 11  31.5 seconds 12



   (2011 02:29:00) ??  (2011 16:20:00) ??  (2011 13:00:00) ??



7Interpretive Data: RECOMMENDED RANGES FOR PROTIME INR:   2.0-3.0 for most 
medical and surgical thromboembolic states.   2.5-3.5 for artificial heart 
valves and recurrent embolism.INR SHOULD BE USED ONLY FOR PATIENTS ON STABLE 
ANTICOAGULANT THERAPY.8Interpretive Data: RECOMMENDED RANGES FOR PROTIME INR:   
2.0-3.0 for most medical and surgical thromboembolic states.   2.5-3.5 for 
artificial heart valves and recurrent embolism.INR SHOULD BE USED ONLY FOR 
PATIENTS ON STABLE ANTICOAGULANT THERAPY.9Interpretive Data: RECOMMENDED RANGES 
FOR PROTIME INR:   2.0-3.0 for most medical and surgical thromboembolic states.
   2.5-3.5 for artificial heart valves and recurrent embolism.INR SHOULD BE 
USED ONLY FOR PATIENTS ON STABLE ANTICOAGULANT THERAPY.10Interpretive Data: 
Heparin Therapeutic Range:  57 - 92 [id number] Data: Heparin 
Therapeutic Range:  57 - 92 [id number] Data: Heparin Therapeutic 
Range:  57 - 92 Seconds      Microbiology Reports



     PROCEDURE:Culture: Resistant Acinetobacter Screen

                  STATUS:                  Auth (Verified)





 BODY SITE:  ??  COLLECTED DATE/TIME:  2011 15:00:00

 

 SOURCE:  Nasal Wash  FREE TEXT SOURCE:  SWAB



***FINAL REPORTS***       Final ReportNo Acinetobacter Isolated***PRELIMINARY 
REPORTS***       Preliminary ReportCulture In Progress

## 2019-08-03 NOTE — XMS REPORT
Summary of Care: 18 - 18

 Created on:2022



Patient:KIM MEDINA

Sex:Female

:1940

External Reference #:[id number]





Demographics







 Address  [address] [facility]



   [place] [address]

 

 Home Phone  [phone]

 

 Email Address  [email or web address]

 

 Preferred Language  English

 

 Marital Status  

 

 Yazdanism Affiliation  None

 

 Race  White

 

 Additional Race(s)  Unavailable

 

 Ethnic Group  Not  or 









Author







 Organization  [facility] [place]

 

 Address  [address], [address]



   [place] [address]-

 

 Phone  [phone]









Encounter

HQ Jordin_ericka(FIN) [id number] Date(s): 18 - 18

[facility] [place] [address], [address] [place] [address]-     [phone]

Discharge Disposition: Home or Self Care

Attending Physician: Bryon Lehman MD

Referring Physician: Bryon Lehman MD





Vital Signs







 Most recent to oldest [Reference Range]:  1

 

 Height  162.56 cm



   (18 1:11 PM)

 

 Blood Pressure [/60-90 mmHg]  163/98 mmHg



   *HI*



   (18 1:11 PM)

 

 Peripheral Pulse Rate [ bpm]  67 bpm



   (18 1:11 PM)

 

 Weight  86.591 kg



   (18 1:11 PM)

 

 Body Mass Index  32.77 m2



   (18 1:11 PM)







Problem List







 Condition  Effective Dates  Status  Health Status  Informant

 

 Knee joint pain(Confirmed)  < 2/10/05  Resolved    

 

 Benign essential hypertension1  09  Active    

 

 Cerebral angiogram(Confirmed)    Active    

 

 Degenerative joint disease involving  07  Active    



 multiple joints2        

 

 Dermatitis3  11/10/08  Active    

 

 Dyslipidemia4    Active    

 

 Hypertension(Confirmed)    Active    

 

 Brain aneurysm(Confirmed)  < 11  Resolved    

 

 Neck pain(Confirmed)    Active    

 

 Nonruptured cerebral aneurysm5    Active    

 

 Operative procedure on cerebral  11  Active    



 aneurysm(Confirmed)        

 

 Overweight6  11/10/08  Active    

 

 Simple obesity(Confirmed)    Active    



1Data migrated from [employer] on 5/30/15.2Data migrated from [employer] 
on 5/30/15.3Data migrated from [employer] on 5/30/15.4Data migrated from [employer] 
[employer] on 5/30/15.5Data migrated from [employer] on 8/25/15.6Data 
migrated from [employer] on 5/30/15.



Allergies, Adverse Reactions, Alerts







 Substance  Reaction  Severity  Status

 

 NKDA      Active







Medications

No Known Medications



Results

No data available for this section



Immunizations

No data available for this section



Procedures







 Procedure  Date  Related Diagnosis  Body Site  Status

 

 Brain operations1  5/10/11      Completed

 

 Cataract surgery        Completed

 

 Knee arthroplasty        Completed



1ANURYSM



Social History







 Social History Type  Response

 

 Alcohol  Past

 

 Smoking Status  Former smoker; Exposure to Tobacco Smoke None; Cigarette 
Smoking Last 365 Days No; Reg Smoking Cessation Counseling No



   entered on: 18







Assessment and Plan

No data available for this section

## 2019-08-03 NOTE — XMS REPORT
Summary of Care: 8/10/17 - 8/10/17

 Created on:2040



Patient:KIM MEDINA

Sex:Female

:1940

External Reference #:[id number]





Demographics







 Address  [address]



   [place] [address]

 

 Home Phone  [phone]

 

 Preferred Language  English

 

 Marital Status  

 

 Faith Affiliation  None

 

 Race  White/

 

 Ethnic Group  Not  or 









Author







 Organization  [facility]

 

 Address  [address]



   [place] [address]-

 

 Phone  [phone]









Encounter

HQ Saqib(FIN) [id number] Date(s): 8/10/17 - 8/10/17

[facility] [address] [place] [address]     us [phone]

Discharge Disposition: Home or Self Care

Attending Physician: Laverne Hamilton MD

Referring Physician: Laverne Hamilton MD





Vital Signs







 Most recent to oldest  1  2  3



 [Reference Range]:      

 

 Height  162.56 cm    



   (8/3/17 9:00 AM)    

 

 Blood Pressure [/60-90  151/81 mmHg  150/83 mmHg  144/62 mmHg



 mmHg]  *HI*  *HI*  *HI*



   (8/10/17 12:30 PM)  (8/10/17 12:15 PM)  (8/10/17 12:00 PM)

 

 Respiratory Rate [14-20  15 BRMIN  20 BRMIN  14 BRMIN



 BRMIN]  (8/10/17 12:30 PM)  (8/10/17 12:15 PM)  (8/10/17 12:00 PM)

 

 Peripheral Pulse Rate  61 bpm    



 [ bpm]  (8/10/17 9:16 AM)    

 

 Weight  84.091 kg    



   (8/3/17 9:00 AM)    

 

 Body Mass Index  31.82 m2    



   (8/3/17 9:00 AM)    







Problem List







 Condition  Effective Dates  Status  Health Status  Informant

 

 Knee joint pain(Confirmed)  < 2/10/05  Resolved    

 

 Benign essential hypertension1  09  Active    

 

 Cerebral angiogram(Confirmed)    Active    

 

 Degenerative joint disease involving  07  Active    



 multiple joints2        

 

 Dermatitis3  11/10/08  Active    

 

 Dyslipidemia4    Active    

 

 Hypertension(Confirmed)    Active    

 

 Brain aneurysm(Confirmed)  < 11  Resolved    

 

 Nonruptured cerebral aneurysm5    Active    

 

 Operative procedure on cerebral  11  Active    



 aneurysm(Confirmed)        

 

 Overweight6  11/10/08  Active    



1Data migrated from [employer] on 5/30/15.2Data migrated from [employer] 
on 5/30/15.3Data migrated from [employer] on 5/30/15.4Data migrated from [employer] on 5/30/15.5Data migrated from [employer] on 8/25/15.6Data 
migrated from [employer] on 5/30/15.



Allergies, Adverse Reactions, Alerts







 Substance  Reaction  Severity  Status

 

 NKDA      Active







Medications

ANES flumazenil

0.2 mg, 2 mL, Route: IVP, Drug form: INJ, PRN, Dosing Weight 84.091, kg, PRN 
Benzodiazepine Reversal, Initial dose, Start date: 08/10/17 11:43:00 CDT, 
Duration: 30 day, Stop date: 17 11:42:00 CDT

Notes: (Same as: Romazicon)

Start Date: 8/10/17

Stop Date: 8/10/17

Status: DiscontinuedANES flumazenil

0.2 mg, 2 mL, Route: IVP, Drug form: INJ, PRN, Dosing Weight 84.091, kg, PRN 
Benzodiazepine Reversal, Initial dose, Start date: 08/10/17 11:32:00 CDT, 
Duration: 30 day, Stop date: 17 11:31:00 CDT

Notes: (Same as: Romazicon)

Start Date: 8/10/17

Stop Date: 8/10/17

Status: DiscontinuedANES naloxone

0.4 mg, 1 mL, Route: IVP, Drug form: INJ, Q2MIN, Dosing Weight 84.091, kg, PRN 
Narcotic Reversal, Start date: 08/10/17 11:43:00 CDT, Duration: 8 doses or times
, Stop date: Limited # of times

Notes: Same as Narcan

Start Date: 8/10/17

Stop Date: 8/10/17

Status: DiscontinuedANES naloxone

0.4 mg, 1 mL, Route: IVP, Drug form: INJ, Q2MIN, Dosing Weight 84.091, kg, PRN 
Narcotic Reversal, Start date: 08/10/17 11:32:00 CDT, Duration: 8 doses or times
, Stop date: Limited # of times

Notes: Same as Narcan

Start Date: 8/10/17

Stop Date: 8/10/17

Status: DiscontinuedANES ondansetron

4 mg, 2 mL, Route: IVP, Drug form: INJ, ONCE, Dosing Weight 84.091, kg, PRN 
Nausea &amp; Vomiting, Start date: 08/10/17 11:43:00 CDT

Notes: (Same as: Zofran)  *** MEDICATION WASTE ***Product Size:  4 mgProduct 
Wasted:  ___ mg

Start Date: 8/10/17

Stop Date: 8/10/17

Status: DiscontinuedANES ondansetron

4 mg, 2 mL, Route: IVP, Drug form: INJ, ONCE, Dosing Weight 84.091, kg, PRN 
Nausea &amp; Vomiting, Start date: 08/10/17 11:32:00 CDT

Notes: (Same as: Zofran)  *** MEDICATION WASTE ***Product Size:  4 mgProduct 
Wasted:  ___ mg

Start Date: 8/10/17

Stop Date: 8/10/17

Status: Discontinuedchlorthalidone 25 mg oral tablet

25 mg=1 tab, PO, Daily, # 30 tab, 0 Refill(s)

Start Date: 8/3/17

Status: Orderedfolic acid 0.4 mg oral tablet

0.4 mg=1 tab, PO, Daily, # 100 tab, 0 Refill(s)

Start Date: 8/3/17

Status: OrderedLactated Ringers 1,000 mL

1,000 mL, Rate: 25 ml/hr, Infuse over: 40 hr, Route: IV, Dosing Weight 84.091 kg
, Total Volume: 1,000, Start date: 08/10/17 9:10:00 CDT, Duration: 12 hr, Stop 
date: 08/10/17 21:09:00 CDT

Start Date: 8/10/17

Stop Date: 8/10/17

Status: DiscontinuedLivalo 2 mg oral tablet

2 mg=1 tab, PO, Bedtime, # 30 tab, 3 Refill(s)

Start Date: 8/3/17

Status: Ordered



Results

No data available for this section



Immunizations

No data available for this section



Procedures







 Procedure  Date  Related Diagnosis  Body Site

 

 Brain operations1  5/10/11    

 

 Cataract surgery      

 

 Knee arthroplasty      



1ANURYSM



Social History







 Social History Type  Response

 

 Alcohol  Past

 

 Smoking Status  Former smoker; Exposure to Tobacco Smoke None; Cigarette 
Smoking



   Last 365 Days No; Reg Smoking Cessation Counseling No







Assessment and Plan

No data available for this section

## 2019-08-03 NOTE — XMS REPORT
Summary of Care: 18 - 5/15/18

 Created on:2027



Patient:KIM MEDINA

Sex:Female

:1940

External Reference #:[id number]





Demographics







 Address  [address]



   [place] [address]

 

 Home Phone  [phone]

 

 Email Address  [email or web address]

 

 Preferred Language  English

 

 Marital Status  

 

 Shinto Affiliation  None

 

 Race  White

 

 Additional Race(s)  Unavailable

 

 Ethnic Group  Not  or 









Author







 Organization  [facility] [place]

 

 Address  [address]



   [place] [address]-

 

 Phone  [phone]









Encounter

HQ Encntr_alias(FIN) [id number] Date(s): 18 - 5/15/18

[facility] [place] [address], [address] [place] [address]-      [phone]





Vital Signs

No data available for this section



Problem List







 Condition  Effective Dates  Status  Health Status  Informant

 

 Knee joint pain(Confirmed)  < 2/10/05  Resolved    

 

 Benign essential hypertension1  09  Active    

 

 Cerebral angiogram(Confirmed)    Active    

 

 Degenerative joint disease involving  07  Active    



 multiple joints2        

 

 Dermatitis3  11/10/08  Active    

 

 Dyslipidemia4    Active    

 

 Hypertension(Confirmed)    Active    

 

 Brain aneurysm(Confirmed)  < 11  Resolved    

 

 Nonruptured cerebral aneurysm5    Active    

 

 Operative procedure on cerebral  11  Active    



 aneurysm(Confirmed)        

 

 Overweight6  11/10/08  Active    



1Data migrated from [employer] on 5/30/15.2Data migrated from [employer] 
on 5/30/15.3Data migrated from [employer] on 5/30/15.4Data migrated from [employer] on 5/30/15.5Data migrated from [employer] on 8/25/15.6Data 
migrated from [employer] on 5/30/15.



Allergies, Adverse Reactions, Alerts







 Substance  Reaction  Severity  Status

 

 NKDA      Active







Medications

No data available for this section



Results

No data available for this section



Immunizations

No data available for this section



Procedures







 Procedure  Date  Related Diagnosis  Body Site  Status

 

 Brain operations1  5/10/11      Completed

 

 Cataract surgery        Completed

 

 Knee arthroplasty        Completed



1ANURYSM



Social History







 Social History Type  Response

 

 Alcohol  Past

 

 Smoking Status  Former smoker; Exposure to Tobacco Smoke None; Cigarette 
Smoking Last 365 Days No; Reg Smoking Cessation Counseling No



   entered on: 8/10/17







Assessment and Plan

No data available for this section

## 2019-08-03 NOTE — XMS REPORT
Encounter CCD: 2012 to 2012

 Created on:2125



Patient:KIM MEDINA

Sex:Female

:1940

External Reference #:[id number]





Demographics







 Address  [address] [place] [address] [place] [address]

 

 Home Phone  [phone]

 

 Preferred Language  Unknown

 

 Marital Status  

 

 Sabianism Affiliation  None

 

 Race  White/

 

 Ethnic Group  Non-









Author







 Organization  [facility]









Care Team Providers







 Name  Role  Phone

 

 Stacy Raulito Morillo  Referring Provider  [phone]









Allergies, Adverse Reactions, Alerts







 Substance  Reaction  Status

 

 NKDA    Active







Problem List







 Condition  Effective Dates  Status

 

 Cerebral angiogram    Active

 

 Operative procedure on cerebral aneurysm  2011  Active







Medications







 Medication  Instructions  Start Date  End Date  Status

 

 Zofran  4 mg, 2 mL, Route: IVP,  2012  Discontinued



   Drug form: INJ, Q4H, PRN      



   Nausea & Vomiting, Start      



   date: 12 12:21:00,      



   Duration: 30 day, Stop      



   date: 12 12:20:00      

 

 Sodium Chloride 0.9% IV  1,000 mL, Rate: 125 ml/hr,  2012  
Discontinued



 1,000 mL  Infuse over: 8 hr, Route:      



   IV, Total Volume: 1,000,      



   Start date: 12      



   12:10:00, Duration: 30      



   day, Stop date: 12      



   12:09:00      

 

 Bystolic 10 mg oral  10 mg, 1 tab, PO, Daily,  2012    Ordered



 tablet  30 tab, Substitution      



   Allowed, TAB      







Vital Signs







 Most recent to oldest [Reference Range]:  1

 

 Height  162.56 cm



   (2012 06:26:00)

 

 Systolic Blood Pressure [ mmHg]  137 mmHg



   (2012 06:47:00)

 

 Diastolic Blood Pressure [60-90 mmHg]  72 mmHg



   (2012 06:47:00)

 

 Respiratory Rate [14-20 BRMIN]  20 BRMIN



   (2012 06:47:00)

 

 Peripheral Pulse Rate [ bpm]  71 bpm



   (2012 06:47:00)

 

 Weight  80.455 kg



   (2012 06:26:00)

## 2019-08-03 NOTE — XMS REPORT
Continuity of Care Document

 Created on:2019



Patient:KIM MEDINA

Sex:Female

:1940

External Reference #:[id number]





Demographics







 Address  [address] [place] [address]



   [place] [address]

 

 Home Phone  [phone]

 

 Preferred Language  en-US

 

 Marital Status  Unknown

 

 Anabaptism Affiliation  Unknown

 

 Race  Unknown

 

 Ethnic Group  Unknown









Author







 Organization  [facility] [place]









Care Team Providers







 Name  Role  Phone

 

 [facility] [place]  Unavailable  Unavailable









Problems







 Problem  Status  Onset  Classification  Date  Comments  Source



     Date    Reported    



             



             



             

 

 R51 - HEADACHE  Active          [facility]



     [address]        [place]



             



             



             

 

 RIGHT INTERNAL CAROTID  Active          [facility]



 ARTERY ANEURYSM    012        [facility]



             



             

 

 Brain aneurysm  Resolved    Problem  2019    [facility]



     011        Neuro,



             OP



             Imaging -



             [facility]



             



             

 

 Operative procedure on  Active    Problem  2012    [facility]



 cerebral aneurysm    [address] [place] [address]



             



             

 

 Operative procedure on  Active    Problem  2019    [facility]



 cerebral aneurysm    011        Neuro,



             OP



             Imaging -



             [facility]



             



             

 

 RIGHT SIDED  Active          [facility]



 COMMUNICATING SEGMENT    [address] [place] [address]            Center



             



             

 

 Benign essential  Active    Problem  2019  Data  [facility]



 hypertension1    009      migrated  Neuro,[facility]



           from GE  OP



           [facility]ric[facility]  Imaging -



           on 5/30/15.  [facility]



             



             

 

 Dermatitis3  Active  11/10/2  Problem  2019  Data  [facility]



     008      migrated  Neuro,[facility]



           from GE  OP



           [facility]ric[facility]  Imaging -



           on 5/30/15.  [facility]



             



             

 

 Overweight6  Active  11/10/2  Problem  2019  Data  [facility]



     008      migrated  Neuro,[facility]



           from GE  OP



           Centric[facility]  Imaging -



           on 5/30/15.  [facility]



             



             

 

 Degenerative joint  Active    Problem  2019  Data  [facility]



 disease involving    007      migrated  Neuro,



 multiple joints2          from GE  OP



           Centricity  Imaging -



           on 5/30/15.  [facility]



             



             

 

 Knee joint pain  Resolved  02/10/2  Problem  2019    [facility]



     005        Neuro,



             OP



             Imaging -



             [facility]



             



             

 

 Cerebral angiogram  Active    Problem  2012    [facility]



             



             

 

 Cerebral angiogram  Active    Problem  2019    [facility]



             Neuro,



             OP



             Imaging -



             [facility]



             



             

 

 Dyslipidemia4  Active    Problem  2019  Data  [facility]



           migrated  Neuro,



           from GE  OP



           Centric[facility]  Imaging -



           on 5/30/15.  [place]



             



             

 

 Hypertension  Active    Problem  2019    [facility]



             [facility]



             OP



             Imaging -



             [place]



             



             

 

 Nonruptured cerebral  Active    Problem  2019  Data  [facility]



 aneurysm5          migrated  Neuro,



           from GE  OP



           Centric[facility]  Imaging -



           on 8/25/15.  [place]



             



             

 

 Hypercholesterolemia  Active    Problem  2019    [facility]



             



             

 

 Neck pain  Active    Problem  2019    [facility]



             [facility]



             OP



             Imaging -



             [place]



             



             

 

 Osteoarthritis  Active    Problem  2019    [facility]



             



             

 

 Simple obesity  Active    Problem  2019    [facility]



             Imaging -



             [place]



             



             

 

 NONRUPT CEREBRAL  Active          [facility]



             



             

 

 ADMINISTRTVE ENCOUNT  Active          [facility]



             



             







Medications







 Medication  Details  Route  Status  Patient  Ordering  Order  Source



         Instructions  Provider  Date  



               



               



               

 

 Aspirin 325 MG  325 mg, 1 tab,    No Longer      



 Enteric Coated  Route: PO,    Active        [facility]



 Tablet  Drug form:            [facility]



   ECTAB, Daily,            



   Dosing Weight            



   87.6, kg,            



   Start date:            



   19            



   9:00:00 CST,            



   Duration: 30            



   day, Stop            



   date: 19            



   9:00:00            



   CSTNotes: (Do            



   Not Crush)  Do            



   not crush or            



   chew.            



               

 

 celecoxib  200 mg, 2 cap,    Inactive      



   Route: PO,            [facility]



   Drug form:            [facility]



   CAP, BID,            



   Dosing Weight            



   87.6, kg,            



   Start date:            



   19            



   17:00:00 CST,            



   Duration: 30            



   day, Stop            



   date: 19            



   9:00:00            



   CSTNotes:            



   NSAID. Please            



   check            



   indication.            



   Not for            



   seizure. (Same            



   As: CeleBREX )            



               



               

 

 Zofran  4 mg, 1 tab,    Inactive      



   Route: PO,          2019  [facility]



   Drug form:            [facility]



   TAB, Q8H,            



   Dosing Weight            



   87.6, kg,            



   Start date:            



   19            



   16:00:00 CST,            



   Duration: 30            



   day, Stop            



   date: 19            



   8:00:00            



   CSTNotes:            



   (Same as:            



   Zofran)            



               



               

 

 Ondansetron  4 mg, Route:    Inactive      



   PO, Q8H,          2019  [facility]



   Dosing Weight            Hospital



   87.6, kg,            



   Start date:            



   19            



   16:00:00 CST,            



   Duration: 30            



   day, Stop            



   date: 19            



   8:00:00 CST            



               



               

 

 Acetaminophen  1,000 mg, 2    Inactive        



   tab, Route:          2019  [facility]



   PO, Drug form:            Hospital



   TAB, TID,            



   Dosing Weight            



   87.6, kg,            



   Start date:            



   19            



   13:00:00 CST,            



   Duration: 30            



   day, Stop            



   date: 19            



   9:00:00            



   CSTNotes: Max            



   acetaminophen            



   4000 mg/day (4            



   gm/day).            



   (Same as:            



   Tylenol Extra            



   Strength)            



               



               

 

 tramadol  100 mg, 2 tab,    Inactive        



 hydrochloride 50  Route: PO,          2019  [facility]



 MG Oral Tablet  Drug form:            Hospital



   TAB, Q6H,            



   Dosing Weight            



   87.6, kg,            



   Start date:            



   19            



   12:00:00 CST,            



   Duration: 30            



   day, Stop            



   date: 19            



   6:00:00            



   CSTNotes: Not            



   to exceed            



   400mg/day.            



   (Same As:            



   Ultram)            



               



               

 

 tramadol  100 mg=2 tab,    Active        



 hydrochloride 50  PO, Q6H, # 100          2019  [facility]



 MG Oral Tablet  tab, 0            Hospital



   Refill(s)            



               



               

 

 celecoxib 200 mg  200 mg, PO,    Active      



 oral capsule  BID, # 60          2019  [facility]



   caplet, 1            Hospital



   Refill(s),            



   Pharmacy:            



   [place] [id number]            



               



               

 

 Acetaminophen  1,000 mg, PO,    Active        



   TID, 0          2019  [facility]



   Refill(s)            [facility]



               



               

 

 Aspirin 325 MG  325 mg, PO,    Active        



 Enteric Coated  Daily, 0          2019  [facility]



 Tablet  Refill(s)            [facility]



               



               

 

 ketOROLAC 30  15 mg, 0.5 mL,    Inactive        



 mg/mL injectable  Route: IVP,          2019  [facility]



 solution  Drug form:            Hospital



   INJ, Q6H,            



   Dosing Weight            



   87.6, kg, PRN            



   Pain Score            



   7-10, Start            



   date: 19            



   10:15:00 CST,            



   Duration: 1            



   doses or            



   times, Stop            



   date: Limited            



   # of            



   timesNotes:            



   (Same            



   as:Toradol) IV            



   bolus must be            



   given >15            



   seconds. Give            



   IM            



   administration            



   slowly and            



   deeply into            



   the muscle.            



   Not for use >            



   4 days   ***            



   MEDICATION            



   WASTE ***            



   Product Size:            



   30 mg Product            



   Wasted:  ___            



   mg            



               

 

 Aluminum  30 mL, Route:    Inactive      



 Hydroxide 40  PO, Drug Form:          2019  [facility]



 MG/ML / Magnesium  SUSP, Dosing            Hospital



 Hydroxide 40  Weight 87.6,            



 MG/ML /  kg, Q4H, PRN            



 Simethicone 4  Indigestion,            



 MG/ML Oral  Start date:            



 Suspension  19            



   10:15:00 CST,            



   Duration: 30            



   day, Stop            



   date: 19            



   10:14:00            



   CSTNotes:            



   (aluminum            



   hydroxide-magn            



   esium            



   hyd-simethicon            



   e            



   [id number]/5            



   ml 30 ml ud            



   JACQUE)            



               

 

 Morphine  2 mg, 1 mL,    Inactive      



   Route: IVP,            [facility]



   Drug form:            [facility]



   SOLN, Q4H,            



   Dosing Weight            



   87.6, kg, PRN            



   Pain Score            



   7-10, Start            



   date: 19            



   10:15:00 CST,            



   Stop date:            



   19            



   22:00:00 CST            



               



               

 

 Oxycodone  5 mg, 1 tab,    Inactive      



 Hydrochloride 5  Route: PO,          2019  [facility]



 MG Oral Tablet  Drug form:            [facility]



   TAB, Q4H,            



   Dosing Weight            



   87.6, kg, PRN            



   Pain Score            



   4-6, Start            



   date: 19            



   10:15:00 CST,            



   Duration: 30            



   day, Stop            



   date: 19            



   10:14:00            



   CSTNotes:            



   (Same as:            



   Roxicodone)            



               



               

 

 Ancef  2 gm, 20 mL,    Inactive      



   Route: IVPB,            [facility]



   Drug form:            [facility]



   INJ, ONCE,            



   Dosing Weight            



   87.6, kg,            



   Start date:            



   19            



   10:13:00 CST,            



   Stop date:            



   19            



   10:13:00 CST,            



   ABX            



   Indication:            



   Surgical            



   ProphylaxisNot            



   es: (Same As:            



   Ancef) Inject            



   direct IV            



   slowly over 5            



   minutes.            



   Cefazolin 1gm            



   in sterile            



   water 10mL            



               



               

 

 1/2 NS 1,000 mL  1,000 mL,    Inactive      



   Rate: 75          2019  [facility]



   ml/hr, Infuse            Hospital



   over: 13.3 hr,            



   Route: IV,            



   Dosing Weight            



   87.6 kg, Total            



   Volume: 1,000,            



   Start date:            



   19            



   10:13:00 CST,            



   Duration: 30            



   day, Stop            



   date: 19            



   10:12:00 CST,            



   2, m2            



               

 

 Promethazine  12.5 mg, 0.5    Inactive      



   mL, Route: IM,            [facility]



   Drug form:            Hospital



   INJ, ONCE,            



   Dosing Weight            



   87.6, kg, PRN            



   Nausea &            



   Vomiting,            



   Start date:            



   19            



   9:51:00            



   CSTNotes: Do            



   not give IV            



   push.  (Same            



   as: Phenergan)            



               



               

 

 Meperidine  12.5 mg, 0.5    Inactive        



   mL, Route:          2019  [facility]



   IVP, Drug            Hospital



   form: INJ,            



   Q30Min, Dosing            



   Weight 87.6,            



   kg, PRN Other            



   -See Comment,            



   For shivering,            



   Start date:            



   19            



   9:51:00 CST,            



   Duration: 2            



   doses or            



   times, Stop            



   date: Limited            



   # of            



   timesNotes:            



   (Same as:            



   Demerol)  "Use            



   Precaution in            



   Elderly,            



   Seizure            



   disorders, and            



   Renal            



   impairment"            



               



               

 

 Ondansetron  4 mg, 2 mL,    Inactive      



   Route: IVP,            [facility]



   Drug form:            Hospital



   INJ, ONCE,            



   Dosing Weight            



   87.6, kg, PRN            



   Nausea &            



   Vomiting,            



   Start date:            



   19            



   9:51:00            



   CSTNotes:            



   (Same as:            



   Zofran)   ***            



   MEDICATION            



   WASTE ***            



   Product Size:            



   4 mg Product            



   Wasted:  ___            



   mg            



               

 

 Albuterol 0.83  2.49 mg, 3 mL,    Inactive      



 MG/ML Inhalant  Route: NEB,            [facility]



 Solution  Drug form:            Hospital



   SOLN, Q20Min,            



   Dosing Weight            



   87.6, kg, PRN            



   Wheezing,            



   Priority:            



   STAT, Start            



   date: 19            



   9:51:00 CST,            



   Duration: 30            



   day, Stop            



   date: 19            



   9:50:00            



   CSTNotes: SEE            



   RT            



   DOCUMENTATION            



   (Same as:            



   Proventil)            



               



               

 

 Diphenhydramine  12.5 mg, 0.25    Inactive        



   mL, Route:          2019  [facility]



   IVP, Drug            Hospital



   form: INJ,            



   Q6H, Dosing            



   Weight 87.6,            



   kg, PRN            



   Itching, Start            



   date: 19            



   9:51:00 CST,            



   Duration: 30            



   day, Stop            



   date: 19            



   9:50:00            



   CSTNotes:            



   (Same as:            



   Benadryl)            



               



               

 

 Naloxone  0.4 mg, 1 mL,    Inactive      



   Route: IVP2019  [facility]



   Drug form:            Hospital



   INJ, Q2MIN,            



   Dosing Weight            



   87.6, kg, PRN            



   Narcotic            



   Reversal,            



   Start date:            



   19            



   9:51:00 CST,            



   Duration: 8            



   doses or            



   times, Stop            



   date: Limited            



   # of            



   timesNotes:            



   Same as Narcan            



               



               

 

 Flumazenil  0.2 mg, 2 mL,    Inactive      



   Route: IVP2019  [facility]



   Drug form:            Hospital



   INJ, PRN,            



   Dosing Weight            



   87.6, kg, PRN            



   Benzodiazepine            



   Reversal,            



   Initial dose,            



   Start date:            



   19            



   9:51:00 CST,            



   Duration: 30            



   day, Stop            



   date: 19            



   9:50:00            



   CSTNotes:            



   (Same as:            



   Romazicon)            



               



               

 

 Morphine  4 mg, 1 mL,    Inactive      



   Route: IVP2019  [facility]



   Drug form:            Hospital



   SOLN, Q5Min,            



   Dosing Weight            



   87.6, kg, PRN            



   Pain Score            



   7-10, Start            



   date: 19            



   9:51:00 CST,            



   Duration: 3            



   doses or            



   times, Stop            



   date: 19            



   22:00:00            



   CSTNotes:            



   (Same            



   as:MORPhine            



   Sulfate)            



               



               

 

 Hydralazine  10 mg, 0.5 mL,    Inactive      



   Route: IVP2019  [facility]



   Drug form:            Hospital



   INJ, Q20Min,            



   Dosing Weight            



   87.6, kg, PRN            



   Elevated BP,            



   Start date:            



   19            



   9:51:00 CST,            



   Duration: 2            



   doses or            



   times, Stop            



   date: Limited            



   # of            



   timesNotes:            



   (Same as:            



   Apresoline)            



   Push over 5            



   minutes            



               



               

 

 Ketorolac  15 mg, 0.5 mL,    Inactive      



   Route: IVP2019  [facility]



   Drug form:            Hospital



   INJ, ONCE,            



   Dosing Weight            



   87.6, kg,            



   Start date:            



   19            



   9:51:00 CST,            



   Stop date:            



   19            



   9:51:00            



   CSTNotes:            



   (Same            



   as:Toradol) IV            



   bolus must be            



   given >15            



   seconds. Give            



   IM            



   administration            



   slowly and            



   deeply into            



   the muscle.            



   Not for use >            



   4 days   ***            



   MEDICATION            



   WASTE ***            



   Product Size:            



   30 mg Product            



   Wasted:  _15__            



   mg            



               

 

 Acetaminophen  1,000 mg, 2    Inactive      



   tab, Route:          2019  [facility]



   PO, Drug form:            Hospital



   TAB, ONCE,            



   Dosing Weight            



   87.6, kg, PRN            



   Pain Score            



   1-3, Start            



   date: 19            



   9:51:00            



   CSTNotes: Max            



   acetaminophen            



   4000 mg/day (4            



   gm/day).            



   (Same as:            



   Tylenol Extra            



   Strength)            



               



               

 

 Metoprolol  1 mg, 1 mL,    Inactive      



   Route: IVP,            [facility]



   Drug form:            Hospital



   INJ, Q5Min,            



   Dosing Weight            



   87.6, kg, PRN            



   Other -See            



   Comment, Start            



   date: 19            



   9:51:00 CST,            



   Duration: 5            



   doses or            



   times, Stop            



   date: Limited            



   # of            



   timesNotes:            



   (Same as:            



   Lopressor)            



   Push over 2            



   minutes            



               



               

 

 Sodium Chloride  Route: IV,    Inactive        



 0.9% IV (ANES) 50  Total Volume:            [facility]



 mL  50, Start            Hospital



   date: 19            



   9:27:00 CST,            



   Stop date:            



   19            



   10:27:00 CST            



               



               

 

 Sodium Chloride  Route: IV,    Inactive        



 0.9% IV (ANES)  Total Volume:            [facility]



 1000 mL  1,000, Start            Hospital



   date: 19            



   9:04:00 CST,            



   Stop date:            



   19            



   10:04:00 CST            



               



               

 

 glycopyrrolate  Route: IV,    Inactive        



 (ANES)  Drug form:            [facility]



   INJ, ONCE,            Hospital



   Stop date:            



   19            



   8:54:00 CST            



               



               

 

 dexamethasone  Route: IV,    Inactive        



 (ANES)  Drug form:          2019  [facility]



   INJ, ONCE,            Hospital



   Stop date:            



   19            



   8:54:00 CST            



               



               

 

 ondansetron  Route: IV,    Inactive        



 (ANES)  Drug form:          2019  [facility]



   INJ, ONCE,            Hospital



   Stop date:            



   19            



   8:54:00 CST            



               



               

 

 famotidine (ANES)  Route: IV,    Inactive        



   Drug form:          2019  [facility]



   INJ, ONCE,            Hospital



   Stop date:            



   19            



   8:54:00 CST            



               



               

 

 phenylephrine  Route: IV,    Inactive        



 (ANES)  Drug form:          2019  [facility]



   INJ, ONCE,            Hospital



   Stop date:            



   19            



   8:49:00 CST            



               



               

 

 methylergonovine  Route: IM,    Inactive        



 (ANES)  Drug form:          2019  [facility]



   INJ, ONCE,            Hospital



   Stop date:            



   19            



   8:29:00 CST            



               



               

 

 tranexamic acid  Route: IV,    Inactive        



 (ANES)  Drug form:          2019  [facility]



   INJ, ONCE,            Hospital



   Stop date:            



   19            



   8:24:00 CST            



               



               

 

 ePHEDrine (ANES)  Route: IV,    Inactive        



   Drug form:          2019  [facility]



   INJ, ONCE,            Hospital



   Stop date:            



   19            



   8:24:00 CST            



               



               

 

 ceFAZolin (ANES)  Route: IV,    Inactive        



   Drug form:          2019  [facility]



   INJ, ONCE,            Hospital



   Stop date:            



   19            



   7:59:00 CST            



               



               

 

 midazolam (ANES)  Route: IV,    Inactive        



   Drug form:          2019  [facility]



   SOLN, ONCE,            Hospital



   Stop date:            



   19            



   7:59:00 CST            



               



               

 

 bupivacaine  Route:    Inactive        



 (ANES)  INTRATHECAL,          2019  [facility]



   Drug Form:            Hospital



   INJ, ONCE,            



   Stop date:            



   19            



   7:54:00 CST            



               



               

 

 propofol (ANES)  Route: IV,    Inactive        



 10 mg  Drug form:            [facility]



   INJ, Start            Hospital



   date: 19            



   7:30:00 CST,            



   Stop date:            



   19            



   8:30:00 CST            



               



               

 

 Lactated Ringers  Route: IV,    Inactive      



 Injection IV  Total Volume:            [facility]



 (ANES) 1000 mL  1,000, Start            Hospital



   date: 19            



   7:12:00 CST,            



   Stop date:            



   19            



   8:12:00 CST            



               



               

 

 Lyrica  75 mg, 1 cap,    Inactive      



   Route: PO,            [facility]



   Drug form:            [facility]



   CAP, PRE OP,            



   Start date:            



   19            



   6:00:00 CST,            



   Duration: 1            



   doses or            



   timesNotes:            



   (Same as:            



   Lyrica)            



               



               

 

 CeleBREX  400 mg, 4 cap,    Inactive      



   Route: PO,            [facility]



   Drug form:            [facility]



   CAP, PRE OP,            



   Start date:            



   19            



   6:00:00 CST,            



   Duration: 1            



   doses or            



   timesNotes:            



   NSAID. Please            



   check            



   indication.            



   Not for            



   seizure. (Same            



   As: CeleBREX )            



               



               

 

 acetaminophen  1,000 mg, 2    Inactive      



   tab, Route:          2019  [facility]



   PO, Drug form:            [facility]ospital



   TAB, PRE OP,            



   Start date:            



   19            



   6:00:00 CST,            



   Duration: 1            



   doses or            



   timesNotes:            



   Max            



   acetaminophen            



   4000 mg/day (4            



   gm/day).            



   (Same as:            



   Tylenol Extra            



   Strength)            



               



               

 

 ceFAZolin  2 gm, 20 mL,    Inactive      



   Route: IV,            [facility]



   Drug form:            [facility]



   INJ, PRE OP,            



   Start date:            



   19            



   6:00:00 CST,            



   Duration: 1            



   doses or            



   times, ABX            



   Indication:            



   Surgical            



   ProphylaxisNot            



   es: (Same As:            



   Ancef) Inject            



   direct IV            



   slowly over 5            



   minutes.            



   Cefazolin 1gm            



   in sterile            



   water 10mL            



               



               

 

 Calcium Chloride  1,000 mL,    Inactive      



 0.0014 MEQ/ML /  Rate: 25            [facility]



 Potassium  ml/hr, Infuse            Hospital



 Chloride 0.004  over: 40 hr,            



 MEQ/ML / Sodium  Route: IV,            



 Chloride 0.103  Dosing Weight            



 MEQ/ML / Sodium  87.6 kg, Total            



 Lactate 0.028  Volume: 1,000,            



 MEQ/ML Injectable  Start date:            



 Solution  19            



   5:45:00 CST,            



   Duration: 30            



   day, Stop            



   date: 19            



   5:44:00 CST,            



   2, m2            



               

 

 Vitamin D2 50,000  50,000    Active      



 intl units oral  IntlUnit=1            [facility]



 capsule  cap, PO,            Hospital



   Daily, 0            



   Refill(s)            



               



               

 

 Vitamin D2  PO, 0    Inactive      



   Refill(s)          2019  [facility]



               



               

 

 nebivolol 20 MG  20 mg=1 tab,    Active      



 Oral Tablet  PO, Daily, 0          2019  [facility]



 [Bystolic]  Refill(s)            Hospital



               



               

 

 Hydrochlorothiazi  1 tab, PO,    Active      



 de 25 MG /  Daily, 0          2019  [facility]



 Losartan  Refill(s)            [facility]



 Potassium 100 MG              



 Oral Tablet              



               

 

 Flumazenil  0.2 mg, 2 mL,    Inactive      08/10/  [facility]



   Route: IVP,            [facility]



   Drug form:            Hospital



   INJ, PRN,            



   Dosing Weight            



   84.091, kg,            



   PRN            



   Benzodiazepine            



   Reversal,            



   Initial dose,            



   Start date:            



   08/10/17            



   11:43:00 CDT,            



   Duration: 30            



   day, Stop            



   date: 17            



   11:42:00            



   CDTNotes:            



   (Same as:            



   Romaz[place])            



               



               

 

 Naloxone  0.4 mg, 1 mL,    Inactive      08/10/  [facility]



   Route: IVP2017  [facility]



   Drug form:            [facility]ospital



   INJ, Q2MIN,            



   Dosing Weight            



   84.091, kg,            



   PRN Narcotic            



   Reversal,            



   Start date:            



   08/10/17            



   11:43:00 CDT,            



   Duration: 8            



   doses or            



   times, Stop            



   date: Limited            



   # of            



   timesNotes:            



   Same as Narcan            



               



               

 

 Ondansetron  4 mg, 2 mL,    Inactive      08/10/  [facility]



   Route: IVP2017  [facility]



   Drug form:            [facility]ospital



   INJ, ONCE,            



   Dosing Weight            



   84.091, kg,            



   PRN Nausea &            



   Vomiting,            



   Start date:            



   08/10/17            



   11:43:00            



   CDTNotes:            



   (Same as:            



   Zofran)   ***            



   MEDICATION            



   WASTE ***            



   Product Size:            



   4 mg Product            



   Wasted:  ___            



   mg            



               

 

 Ondansetron  4 mg, 2 mL,    Inactive      08/10/  [facility]



   Route: IVP2017  [facility]



   Drug form:            Hospital



   INJ, ONCE,            



   Dosing Weight            



   84.091, kg,            



   PRN Nausea &            



   Vomiting,            



   Start date:            



   08/10/17            



   11:32:00            



   CDTNotes:            



   (Same as:            



   Zofran)   ***            



   MEDICATION            



   WASTE ***            



   Product Size:            



   4 mg Product            



   Wasted:  ___            



   mg            



               

 

 Flumazenil  0.2 mg, 2 mL,    Inactive      08/10/  [facility]



   Route: IVP2017  [facility]



   Drug form:            [facility]ospital



   INJ, PRN,            



   Dosing Weight            



   84.091, kg,            



   PRN            



   Benzodiazepine            



   Reversal,            



   Initial dose,            



   Start date:            



   08/10/17            



   11:32:00 CDT,            



   Duration: 30            



   day, Stop            



   date: 17            



   11:31:00            



   CDTNotes:            



   (Same as:            



   Romazicon)            



               



               

 

 Naloxone  0.4 mg, 1 mL,    Inactive      08/10/  [facility]



   Route: IVP2017  [facility]



   Drug form:            Hospital



   INJ, Q2MIN,            



   Dosing Weight            



   84.091, kg,            



   PRN Narcotic            



   Reversal,            



   Start date:            



   08/10/17            



   11:32:00 CDT,            



   Duration: 8            



   doses or            



   times, Stop            



   date: Limited            



   # of            



   timesNotes:            



   Same as Narcan            



               



               

 

 Calcium Chloride  1,000 mL,    Inactive      08/10/  [facility]



 0.0014 MEQ/ML /  Rate: 25            [facility]



 Potassium  ml/hr, Infuse            Hospital



 Chloride 0.004  over: 40 hr,            



 MEQ/ML / Sodium  Route: IV,            



 Chloride 0.103  Dosing Weight            



 MEQ/ML / Sodium  84.091 kg,            



 Lactate 0.028  Total Volume:            



 MEQ/ML Injectable  1,000, Start            



 Solution  date: 08/10/17            



   9:10:00 CDT,            



   Duration: 12            



   hr, Stop date:            



   08/10/17            



   21:09:00 CDT            



               



               

 

 Folic Acid 0.4 MG  0.4 mg=1 tab,    Active        



 Oral Tablet  PO, Daily, #          2017  [facility]



   100 tab, 0            Hospital



   Refill(s)            



               



               

 

 pitavastatin 2 MG  2 mg=1 tab,    Active        



 Oral Tablet  PO, Bedtime, #          2017  [facility]



 [Livalo]  30 tab, 3            Hospital



   Refill(s)            



               



               

 

 Chlorthalidone 25  25 mg=1 tab,    Active        



 MG Oral Tablet  PO, Daily, #          2017  [facility]



   30 tab, 0            Hospital



   Refill(s)            



               



               

 

 Zofran  4 mg, 2 mL,  IVP  No Longer    St[facility]     [place]



   Route: IVP,    Active        [facility]



   Drug form:            Center



   INJ, Q4H, PRN            



   Nausea &            



   Vomiting,            



   Start date:            



   12            



   12:21:00,            



   Duration: 30            



   day, Stop            



   date: 12            



   12:20:00            



               



               

 

 Sodium Chloride  1,000 mL,  IV  No Longer    Stacy     [place]



 0.9% IV 1,000 mL  Rate: 125    Active        [facility]



   ml/hr, Infuse            Center



   over: 8 hr,            



   Route: IV,            



   Total Volume:            



   1,000, Start            



   date: 12            



   12:10:00,            



   Duration: 30            



   day, Stop            



   date: 12            



   12:09:00            



               



               

 

 Bystolic 10 mg  10 mg, 1 tab,  PO  Active         [place]



 oral tablet  PO, Daily, 2012  [facility]



   tab,            Center



   Substitution            



   Allowed, TAB            



               



               

 

 Zocor  20 mg, 1 tab,  PO  No Longer    Voda     [place]



   Route: PO,    Active        [facility]



   Drug form:            Center



   TAB, Bedtime,            



   Start date:            



   11            



   21:00:00,            



   Duration: 30            



   day, Stop            



   date: 11            



   21:00:00            



               



               

 

 Zetia  10 mg, 1 tab,  PO  No Longer    Voda    [facility]



   Route: PO,    Active        Medical



   Drug form:            [facility]



   TAB, Bedtime,            



   Start date:            



   11            



   21:00:00,            



   Duration: 30            



   day, Stop            



   date: 11            



   21:00:00            



               



               

 

 MaxEPA  2 gm, 2 cap,  PO  No Longer    Solorio    [facility]



   Route: PO,    Active        Medical



   Drug form:            [facility]



   CAP, BID,            



   Start date:            



   11            



   11:00:00,            



   Duration: 30            



   day, Stop            



   date: 11            



   9:00:00            



               



               

 

 Exforge 10 mg-320  1 tab, Route:  PO  No Longer    Voda     [place]



 mg oral tablet  PO, Daily,    Active        [facility]



   Start date:            [facility]



   11            



   9:00:00,            



   Duration: 30            



   day, Stop            



   date: 11            



   9:00:00            



               



               

 

 Diovan  320 mg, 2 tab,  PO  No Longer    Miranda    [facility]



   Route: PO,    Active        Medical



   Drug form:            [place]



   TAB, Daily,            



   Start date:            



   11            



   9:00:00,            



   Duration: 30            



   day, Stop            



   date: 11            



   9:00:00            



               



               

 

 Norvasc  10 mg, 1 tab,  PO  No Longer    Miranda    [facility]



   Route: PO,    Active        Medical



   Drug form:            [place]



   TAB, Daily,            



   Start date:            



   11            



   9:00:00,            



   Duration: 30            



   day, Stop            



   date: 11            



   9:00:00            



               



               

 

 Vytorin 10/20  1 tab, Route:  PO  No Longer    Voda    [facility]



   PO, Daily,    Active        Medical



   Start date:            [place]



   11            



   9:00:00,            



   Duration: 30            



   day, Stop            



   date: 11            



   9:00:00            



               



               

 

 omega-3  Route: PO,  PO  No Longer    Solorio    [facility]



 polyunsaturated  Drug Form:    Active        Medical



 fatty acids with  CAP, BID,            [facility]



 Vitamin B Complex  Start date:            



 oral capsule  11            



   9:00:00,            



   Duration: 30            



   day, Stop            



   date: 11            



   17:00:00            



               



               

 

 docusate sodium  100 mg, 1 cap,  PO  Active    Hernandez     [place]



 100 mg oral  PO, Q12H, 40            Medical



 capsule  cap,            [facility]



   Substitution            



   Allowed, CAP            



               



               

 

 acetaminophen-hyd  2 tab, PO,  PO  Active    [facility]ern[facility]     [place]



 rocodone 325 mg-5  Q4H, PRN, 30            Medical



 mg oral tablet  tab, Pain            Center



   Score 1-3,            



   Substitution            



   Allowed,            



   Maintenance,            



   (not to exceed            



   4000 mg            



   acetaminophen            



   per day),            



   TAB(not to            



   exceed 4000 mg            



   acetaminophen            



   per day)            



               



               

 

 docusate  100 mg, 1 cap,  PO  No Longer    Darwin    [facility]



   Route: PO,    Active        Medical



   Drug form:            [place]



   CAP, Q12H,            



   Start date:            



   11            



   21:00:00,            



   Duration: 30            



   day, Stop            



   date: 11            



   9:00:00            



               



               

 

 Zofran  4 mg, 2 mL,  IVP  No Longer    Darwin    [facility]



   Route: IVP,    Active        Medical



   Drug form:            [place]



   INJ, Q8H, PRN            



   Nausea, Start            



   date: 11            



   17:16:00,            



   Duration: 30            



   day, Stop            



   date: 11            



   17:15:00            



               



               

 

 famotidine 20 mg  20 mg, 1 tab,  PO  No Longer    Darwin    [facility]



 oral tablet  Route: PO,    Active        [facility]



   Drug form:            [place]



   TAB, BID,            



   Start date:            



   11            



   17:00:00,            



   Duration: 30            



   day, Stop            



   date: 11            



   9:00:00            



               



               

 

 labetalol  10 mg, 2 mL,  IVP  No Longer    Voda    [facility]



   Route: IVP,    Active        [facility]



   Drug form:            [place]



   INJ, Q15Min,            



   PRN            



   Hypertension,            



   Start date:            



   11            



   16:03:00,            



   Duration: 30            



   day, Stop            



   date: 11            



   16:02:00            



               



               

 

 normal saline  1,000 mL,  IV  No Longer    Juan Miguel    [facility] [place]



 0.9% IV 1,000 mL  Rate: 75    Active        [facility]



   ml/hr, Infuse            Center



   over: 13.3 hr,            



   Route: IV,            



   Total Volume:            



   1,000, Start            



   date: 11            



   16:02:00,            



   Duration: 30            



   day, Stop            



   date: 11            



   16:01:00            



               



               

 

 acetaminophen-hyd  1 tab, Route:  PO  No Longer    Darwin     [place]



 rocodone 325 mg-5  PO, Drug Form:    Active        [facility]



 mg oral tablet  TAB, Q4H, PRN            Center



   Pain Score            



   1-3, Start            



   date: 11            



   13:53:00,            



   Duration: 30            



   day, Stop            



   date: 11            



   13:52:00            



               



               

 

 midazolam  1 mg, Route:  IV  No Longer    Oneal  [facility]



   IV, ONCE,    Active        [facility]



   Start date:            [place]



   11            



   13:40:00, Stop            



   date: 11            



   13:40:00            



               



               

 

 labetalol  5 mg, 1 mL,  IVP  No Longer    Oneal  [facility]



   Route: IVP,    Active        Medical



   Drug form:            Center



   INJ, Q5Min,            



   PRN Elevated            



   BP, Start            



   date: 11            



   12:50:00,            



   Duration: 5            



   doses or            



   times, Stop            



   date: 11            



   22:00:00            



               



               

 

 hydrALAZINE  5 mg, 0.25 mL,  IVP  No Longer    Oneal  [facility]



   Route: IVP,    Active        [facility]



   Drug form:            Center



   INJ, Q5Min,            



   PRN Elevated            



   BP, Start            



   date: 11            



   12:50:00,            



   Duration: 4            



   doses or            



   times, Stop            



   date: Limited            



   # of times            



               



               

 

 ondansetron  4 mg, 2 mL,  IVP  No Longer    Oneal  [facility]



   Route: IVP,    Active        Medical



   Drug form:            Center



   INJ, ONCE,            



   Start date:            



   11            



   12:50:00, Stop            



   date: 11            



   12:50:00            



               



               

 

 promethazine  6.25 mg, 0.25  IVPB  No Longer    Oneal  [facility]



   mL, Route:    Active        Medical



   IVPB, Drug            Center



   form: INJ,            



   ONCE, Start            



   date: 11            



   12:50:00, Stop            



   date: 11            



   12:50:00            



               



               

 

 acetaminophen-hyd  2 tab, Route:  PO  No Longer    Oneal  [facility]



 rocodone 325 mg-5  PO, Drug Form:    Active        Medical



 mg oral tablet  TAB, Q4H, PRN            Center



   Pain Score            



   4-6, Start            



   date: 11            



   12:50:00,            



   Duration: 30            



   day, Stop            



   date: 11            



   12:49:00            



               



               

 

 meperidine  12.5 mg, 0.5  IVP  No Longer    Oneal  [facility]



   mL, Route:    Active        Medical



   IVP, Drug            Center



   form: INJ,            



   Q30Min, PRN            



   Other -See            



   Comment, For            



   shivering,            



   Start date:            



   11            



   12:50:00,            



   Duration: 2            



   doses or            



   times, Stop            



   date: 11            



   0:00:00            



               



               

 

 hydromorphone  0.5 mg, 0.25  IVP  No Longer    Oneal    [facility]



   mL, Route:    Active        [facility]



   IVP, Drug            Center



   form: INJ,            



   Q5Min, PRN            



   Pain Score            



   4-6, Start            



   date: 11            



   12:50:00,            



   Duration: 5            



   doses or            



   times, Stop            



   date: 11            



   21:00:00            



               



               

 

 morphine Sulfate  2 mg, 1 mL,  IVP  No Longer    Oneal    [facility]



   Route: IVP,    Active        Medical



   Drug form:            Center



   INJ, Q5Min,            



   PRN Pain Score            



   4-6, Start            



   date: 11            



   12:50:00,            



   Duration: 8            



   doses or            



   times, Stop            



   date: 11            



   0:00:00            



               



               

 

 Dilaudid  0.5 mg, Route:  IV  No Longer    Tenstrike    [facility]



   IV, ONCE, PRN    Active        [facility]



   Pain, Start            Center



   date: 11            



   12:39:00            



               



               

 

 Lactated Ringers  1,000 mL,  IV  No Longer    [facility]    [facility] [place]



 IV 1,000 mL  Rate: 40    Active      2011  Medical



   ml/hr, Infuse            Center



   over: 25 hr,            



   Route: IV,            



   Total Volume:            



   1,000, Start            



   date: 11            



   8:13:00,            



   Duration: 30            



   day, Stop            



   date: 11            



   8:12:00            



               



               

 

 omega-3  2, PO, BID,  PO  Active    Darwin    [facility] [place]



 polyunsaturated  Substitution            Medical



 fatty acids 1000  Allowed            Center



 mg oral capsule              



               



               

 

 zolpidem 5 mg  1 tab, PO,  PO  Active        [facility] [place]



 oral tablet  PRN, Sleep,            Medical



   Substitution            [place]



   Allowed            



               



               

 

 Exforge HCT 10  1 tab, PO,  PO  Active    [facility]  /  [facility] [place]



 mg-320 mg-25 mg  Daily, 30 tab,            Medical



 oral tablet  Substitution            Center



   Allowed,            



   Maintenance,            



   TAB            



               

 

 Vytorin 10 mg-20  1 tab, PO,  PO  Active    [facility]  /  [facility] [place]



 mg oral tablet  Daily, 30 tab,            Medical



   Substitution            [place]



   Allowed,            



   Maintenance,            



   TAB            



               







Allergies, Adverse Reactions, Alerts







 No Known Medication Allergies







Immunizations







 No Data Provided for This Section







Results







 Order Name  Results  Value  Reference  Date  Interpretation  Comments  Source



       [facility]        



               



               



               

 

 CHEM PANEL  Albumin Lvl  4.0  3.5 - 5.0        [facility]



               



               



               

 

 ELECTROLYTE  AGAP  10.5  10.0 -        



 S      20.0        [facility]



               



               



               

 

 ELECTROLYTE  eGFR  54        Result  



     Comment: The  [facility]



             eGFR is  Hospital



             calculated  



             using the  



             CKD-EPI  



             formula. In  



             most young,  



             healthy  



             individuals  



             the eGFR will  



             be >90  



             mL/min/1.73m2  



             . The eGFR  



             declines with  



             age. An eGFR  



             of 60-89 may  



             be normal in  



             some  



             populations,  



             particularly  



             the elderly,  



             for whom the  



             CKD-EPI  



             formula has  



             not been  



             extensively  



             validated.  



             Use of the  



             eGFR is not  



             recommended  



             in the  



             following  



             populations:<  



             br/><br/>Lore  



             viduals with  



             unstable  



             creatinine  



             concentration  



             s, including  



             pregnant  



             patients and  



             those with  



             serious  



             co-morbid  



             conditions.<b  



             r/><br/>Patie  



             nts with  



             extremes in  



             muscle mass  



             or diet.  



             <br/><br/>The  



             data above  



             are obtained  



             from the  



             National  



             [employer]  



             Disease  



             Education  



             Program  



             (NKDEP) which  



             additionally  



             recommends  



             that when the  



             eGFR is used  



             in patients  



             with extremes  



             of body mass  



             index for  



             purposes of  



             drug dosing,  



             the eGFR  



             should be  



             multiplied by  



             the estimated  



             BMI.  



               

 

 ELECTROLYTE  BUN  22  7 - 22        



       [facility]



               



               



               

 

 ELECTROLYTE  Sodium Lvl  137  135 - 145        



       [facility]



               



               



               

 

 ELECTROLYTE  Creatinine  1.00  0.50 -        



 S  Lvl    1.      [facility]



               



               



               

 

 ELECTROLYTE  Potassium Lvl  3.5  3.5 - 5.1        



       [facility]



               



               



               

 

 ELECTROLYTE  Chloride Lvl  102  95 - 109        



       [facility]



               



               



               

 

 ELECTROLYTE  Calcium Lvl  9.5  8.5 - 10.5        



       [facility]



               



               



               

 

 ELECTROLYTE  CO2  28  24 - 32        



       [facility]



               



               



               

 

 ELECTROLYTE  Glucose Lvl  104  70 - 99        



       [facility]



               



               



               

 

 HEMATOLOGY  RBC X 10x6  4.28  4.20 -        



       5.      [facility]



               



               



               

 

 HEMATOLOGY  WBC X 10x3  6.9  3.7 - 10.4        [facility]



               



               



               

 

 HEMATOLOGY  Hgb  13.0  12.0 -        [facility]



       16.0        [facility]



               



               



               

 

 HEMATOLOGY  Hct  39.8  36.0 -        



       48.0        [facility]



               



               



               

 

 HEMATOLOGY  Platelet  254  133 - 450        [facility]



               



               



               

 

 HEMATOLOGY  MPV  9.7  7.4 - 10.4        [facility]



               



               



               

 

 HEMATOLOGY  MCHC  32.7  32.0 -        [facility]



       36.0        [facility]



               



               



               

 

 HEMATOLOGY  MCV  93.0  80.0 -        [facility]



       98.0        [facility]



               



               



               

 

 HEMATOLOGY  RDW  13.3  11.5 -        [facility]



       14.5        [facility]



               



               



               

 

 HEMATOLOGY  MCH  30.4  27.0 -        [facility]



       31.0        [facility]



               



               



               

 

 HEMATOLOGY  Lymphocytes #  1.6  1.0 - 5.5        [facility]



               



               



               

 

 HEMATOLOGY  Neutrophils #  4.6  1.5 - 8.1        [facility]



               



               



               

 

 HEMATOLOGY  Monocytes #  0.6  0.0 - 0.8        [facility]



               



               



               

 

 HEMATOLOGY  Eosinophils #  0.2  0.0 - 0.5        [facility]



               



               



               

 

 HEMATOLOGY  Segs  65.6  45.0 -        [facility]



       75.0        [facility]



               



               



               

 

 HEMATOLOGY  Lymphocytes  22.4  20.0 -        [facility]



       40.0        [facility]



               



               



               

 

 HEMATOLOGY  Eosinophils  2.4  0.0 - 4.0        [facility]



               



               



               

 

 HEMATOLOGY  Monocytes  9.0  2.0 - 12.0        [facility]



               



               



               

 

 HEMATOLOGY  Basophils  0.6  0.0 - 1.0        [facility]



               



               



               

 

 URINE CHEM  U Cotinine  Negative  Negative        



   Lvl  (19 10:03 AM)          [facility]



               



               



               

 

 BEDSIDE  Gluc POC  96.0  65 - 110    Normal  <sup>2</sup>I  [facility]



 GLUCOSE  [facility]    nterpretive  Medical



 TESTING            Data:  Center



               



             Upper  



             Reportable  



             Limit: 200  



             mg/dL.  



               



               

 

 BEDSIDE  Comment1  Notify      NA    [facility]



 GLUCOSE    RN/MD    /      Medical



 TESTING              Center



               



               



               

 

 BEDSIDE  Gluc POC  117.0  65 - 110    HI  <sup>3</sup>I  [facility]



 GLUCOSE  [facility]    nterpretive  Medical



 TESTING            Data:  [place]



               



             Upper  



             Reportable  



             Limit: 200  



             mg/dL.  



               



               

 

 CHEMISTRY  Phosphorus  3.4  2.5 - 4.5    Normal     [place]



         /2011      Medical



               Center



               



               



               

 

 CHEMISTRY  Ca Norm mgdL  4.84  4.65 -    Normal     [place]



       5.20        Medical



               Center



               



               



               

 

 CHEMISTRY  Ca Ion mgdL  4.8  4.65 -    Normal     [place]



       5.      Medical



               Center



               



               



               

 

 CHEMISTRY  Ca Norm  1.21  1.16 -    Normal     [place]



       1.      Medical



               Center



               



               



               

 

 CHEMISTRY  Ca Ion  1.2  1.16 -    Normal     [place]



       1.30        Medical



               Center



               



               



               

 

 CHEMISTRY  Magnesium Lvl  2.0  1.8 - 2.4    Normal     [place]



         /2011      Medical



               Center



               



               



               

 

 CHEMISTRY  Creatinine  0.8  0.5 - 1.4    Normal    [facility]



   l            Medical



               Center



               



               



               

 

 CHEMISTRY  Chloride Lvl  105.0  95 - 109    Normal     [place]



         /2011      Medical



               Center



               



               



               

 

 CHEMISTRY  CO2  23.0  24 - 32    LOW     [place]



         /2011      Medical



               Center



               



               



               

 

 CHEMISTRY  Glucose Lvl  101.0      NA  <sup>5</sup>I   [place]



         /2011    nterpretive  Medical



             Data:  Center



             Reference  



             Ranges :  



             0 - 7 days  :  



             41 - 90 mg/dL  



             7 days - 150  



             yrs : 70 - 99  



             mg/dL  



             (fasting),  



             based on the  



             clinical  



             recommendatio  



             ns of the  



             [employer]  



             Diabetes  



             Association.  



               



               

 

 CHEMISTRY  BUN  12.0  7 - 22    Normal     [place]



         /2011      Medical



               Center



               



               



               

 

 CHEMISTRY  Calcium Lvl  8.3  8.5 - 10.5    LOW     [place]



         /2011      Medical



               Center



               



               



               

 

 CHEMISTRY  Sodium Lvl  138.0  135 - 145    Normal     [place]



         /2011      Medical



               Center



               



               



               

 

 CHEMISTRY  Potassium Lvl  4.2  3.5 - 5.1    Normal     [place]



         /2011      Medical



               Center



               



               



               

 

 CHEMISTRY  AGAP  14.2  10.0 -    Normal     [place]



       20.0        Medical



               Center



               



               



               

 

 HEMATOLOGY  Basophils  0.4  0.0 - 1.0    Normal     [place]



         /2011      Medical



               Center



               



               



               

 

 HEMATOLOGY  Eosinophils  0.9  0.0 - 4.0    Normal     [place]



         /2011      Medical



               Center



               



               



               

 

 HEMATOLOGY  Monocytes  5.5  2.0 - 12.0    Normal     [place]



         /2011      Medical



               Center



               



               



               

 

 HEMATOLOGY  Lymphocytes  14.9  20.0 -    LOW     [place]



       40.0  /      Medical



               Center



               



               



               

 

 HEMATOLOGY  Segs  78.3  45.0 -    HI     [place]



       75.0  /      Medical



               Center



               



               



               

 

 HEMATOLOGY  Basophils #  0.0  0.0 - 0.2    Normal     [place]



         /2011      Medical



               Center



               



               



               

 

 HEMATOLOGY  Eosinophils #  0.1  0.0 - 0.5    Normal     [place]



         /2011      [facility]



               



               



               

 

 HEMATOLOGY  Monocytes #  0.5  0.0 - 0.8    Normal     [place]



         /2011      [facility]



               



               



               

 

 HEMATOLOGY  Lymphocytes #  1.3  1.0 - 5.5    Normal     [place]



         /2011      [facility]



               



               



               

 

 HEMATOLOGY  Segs-Bands #  6.7  1.5 - 8.1    Normal     [place]



         /2011      [facility]



               



               



               

 

 HEMATOLOGY  INR  1.07  0.85 -    Normal  <sup>7</sup>I  [facility] [place]



       1.17      nterpretive  Medical



             Data:  Center



             RECOMMENDED  



             RANGES FOR  



             PROTIME INR:  



               2.0-3.0 for  



             most medical  



             and surgical  



             thromboemboli  



             c states.  



             2.5-3.5 for  



             artificial  



             heart valves  



             and recurrent  



             embolism.  



             INR SHOULD BE  



             USED ONLY FOR  



             PATIENTS ON  



             STABLE  



             ANTICOAGULANT  



             THERAPY.  



               



               

 

 HEMATOLOGY  PTT  31.3  22.9 -    Normal  <sup>10</sup>  [facility] [place]



       35.8      Interpretive  Medical



             Data: [facility]  Center



             Therapeutic  



             Range:  57 -  



             92 Seconds  



               



               

 

 HEMATOLOGY  PT  13.9  12.0 -    Normal    [facility]



       14.7  /      [facility]



               



               



               

 

 HEMATOLOGY  MPV  9.1  7.4 - 10.4    Normal     [place]



         /2011      [facility]



               



               



               

 

 HEMATOLOGY  Platelet  209.0  133 - 450    Normal     [place]



         /2011      [facility]



               



               



               

 

 HEMATOLOGY  RDW  12.7  11.5 -    Normal    [facility]



       14.5        [facility]



               



               



               

 

 HEMATOLOGY  MCHC  34.2  32.0 -    Normal    [facility]



       36.0        [facility]



               



               



               

 

 HEMATOLOGY  WBC  8.6  3.7 - 10.4    Normal     [place]



         /2011      [facility]



               



               



               

 

 HEMATOLOGY  RBC  3.58  4.20 -    LOW    [facility]



       5.40  /      [facility]



               



               



               

 

 HEMATOLOGY  Hgb  11.2  12.0 -    LOW    [facility]



       16.0  /      [facility]



               



               



               

 

 HEMATOLOGY  MCH  31.4  27.0 -    HI    [facility]



       31.0  /      [facility]



               



               



               

 

 HEMATOLOGY  MCV  92.0  81.0 -    Normal    [facility]



       99.0        [facility]



               



               



               

 

 HEMATOLOGY  Hct  32.9  36.0 -    LOW    [facility]



       48.0  /      [facility]



               



               



               

 

 BEDSIDE  Gluc POC  89.0  65 - 110    Normal  <sup>4</sup>I  [facility]



 GLUCOSE  [facility]      /    nterpretive  Medical



 TESTING            Data:  Center



               



             Upper  



             Reportable  



             Limit: 200  



             mg/dL.  



               



               

 

 BEDSIDE  Comment1  Notify      NA    [facility]



 GLUCOSE    RN/MD    /      Medical



 TESTING              Center



               



               



               

 

 BEDSIDE  Comment1  Notify      NA    [facility]



 GLUCOSE    RN/MD    /      Medical



 TESTING              Center



               



               



               

 

 CHEMISTRY  Ca Ion mgdL  4.32  4.65 -    [facility] [place]



       5.      Medical



               Center



               



               



               

 

 CHEMISTRY  Ca Norm mgdL  4.2  4.65 -    [facility] [place]



       5.      Medical



               Center



               



               



               

 

 CHEMISTRY  Ca Ion  1.08  1.16 -    [facility] [place]



       1.      Medical



               Center



               



               



               

 

 CHEMISTRY  Ca Norm  1.05  1.16 -    [facility] [place]



       1.      Medical



               Center



               



               



               

 

 CHEMISTRY  Phosphorus  3.2  2.5 - 4.5    Normal     [place]



         /2011      [facility]



               



               



               

 

 CHEMISTRY  Magnesium Lvl  1.9  1.8 - 2.4    Normal     [place]



         /2011      [facility]



               



               



               

 

 HEMATOLOGY  Basophils #  0.0  0.0 - 0.2    Normal     [place]



         /2011      [facility]



               



               



               

 

 HEMATOLOGY  Eosinophils #  0.0  0.0 - 0.5    Normal     [place]



         /2011      [facility]



               



               



               

 

 HEMATOLOGY  Monocytes #  0.5  0.0 - 0.8    Normal     [place]



         /2011      Medical



               [place]



               



               



               

 

 HEMATOLOGY  Lymphocytes #  1.3  1.0 - 5.5    Normal     [place]



         /2011      [facility]



               



               



               

 

 HEMATOLOGY  Segs-Bands #  9.4  1.5 - 8.1    [facility] [place]



         /2011      [facility]



               



               



               

 

 HEMATOLOGY  Basophils  0.3  0.0 - 1.0    Normal     [place]



         /2011      [facility]



               



               



               

 

 HEMATOLOGY  Segs  83.3  45.0 -    [facility] [place]



       75.0        Medical



               Center



               



               



               

 

 HEMATOLOGY  Eosinophils  0.3  0.0 - 4.0    Normal     [place]



         /2011      [facility]



               



               



               

 

 HEMATOLOGY  Monocytes  4.6  2.0 - 12.0    Normal     [place]



         /2011      [facility]



               



               



               

 

 HEMATOLOGY  Lymphocytes  11.5  20.0 -    [facility] [place]



       40.0        Medical



               [place]



               



               



               

 

 HEMATOLOGY  Plt Morph  Normal      [facility]



     (2011 16:20:00) ??          [facility]



               



               



               

 

 HEMATOLOGY  RBC Morph  Normal      [facility]



     (2011 16:20:00) ??          Medical



               Center



               



               



               

 

 HEMATOLOGY  MPV  9.4  7.4 - 10.4    Normal     [place]



         /      [facility]



               



               



               

 

 HEMATOLOGY  Platelet  200.0  133 - 450    Normal     [place]



         /      [facility]



               



               



               

 

 HEMATOLOGY  WBC  11.2  3.7 - 10.4    HI     [place]



         /      [facility]



               



               



               

 

 HEMATOLOGY  MCV  92.7  81.0 -    Normal    [facility]



       99.0  /      [facility]



               



               



               

 

 HEMATOLOGY  MCHC  33.5  32.0 -    Normal     [place]



       36.0  /      [facility]



               



               



               

 

 HEMATOLOGY  RDW  12.9  11.5 -    Normal     [place]



       14.5  /      [facility]



               



               



               

 

 HEMATOLOGY  MCH  31.1  27.0 -  11  HI     [place]



       31.0  /      [facility]



               



               



               

 

 HEMATOLOGY  Hct  34.9  36.0 -    LOW     [place]



       48.0  /      [facility]



               



               



               

 

 HEMATOLOGY  RBC  3.76  4.20 -    LOW    [facility]



       5.40  /      [facility]



               



               



               

 

 HEMATOLOGY  Hgb  11.7  12.0 -    LOW    [facility]



       16.0  /      [facility]



               



               



               

 

 HEMATOLOGY  PTT  29.8  22.9 -    Normal  <sup>11</sup>  [facility] [place]



       35.8      Interpretive  Medical



             Data: [facility]  Center



             Therapeutic  



             Range:  57 -  



             92 Seconds  



               



               

 

 HEMATOLOGY  PT  13.5  12.0 -    Normal    [facility]



       14.7  /      [facility]



               



               



               

 

 HEMATOLOGY  INR  1.03  0.85 -    Normal  <sup>8</sup>I  [facility] [place]



       1.17      nterpretive  Medical



             Data:  Center



             RECOMMENDED  



             RANGES FOR  



             PROTIME INR:  



               2.0-3.0 for  



             most medical  



             and surgical  



             thromboemboli  



             c states.  



             2.5-3.5 for  



             artificial  



             heart valves  



             and recurrent  



             embolism.  



             INR SHOULD BE  



             USED ONLY FOR  



             PATIENTS ON  



             STABLE  



             ANTICOAGULANT  



             THERAPY.  



               



               

 

 BACTERIAL -  MRSA by PCR  Negative 1      Normal  <sup>1</sup>I  [facility]



 SEROLOGY    (2011 15:00:00) ??        nterpretive  Medical



             Data:  Center



             INTERPRETATIO  



             N:  



             Negative.....  



             .No MRSA DNA  



             detected by  



             PCR  



             Positive.....  



             .MRSA DNA  



             detected by  



             PCR  ASSAY  



             LIMITATIONS:  



             This is a  



             screening  



             test for  



             colonization  



             by MRSA. A  



             positive  



             test result  



             indicates the  



             patient is  



             colonized by  



             MRSA, but  



             does not  



             necessarily  



             mean that an  



             infection is  



             present or  



             that  



             treatment is  



             necessary.  



             Likewise, a  



             negative test  



             does not  



             exclude  



             colonization  



             or infection.  



              Patients  



             should be  



             evaluated  



             clinically  



             for symptoms  



             and signs of  



             infection  



             before making  



              therapeutic  



             decisions.  



             Routine  



             decolonizatio  



             n is  



             discouraged  



             and should  



             only be  



             considered  



             for select  



             patients  



             after  



             consultation  



             with an  



             infectious  



             diseases  



             specialist.  



               



               

 

 Microbiolog  Culture:            [facility]



 y  Resistant      /2011      Medical



   [facility]            Center



   Screen            



               



               

 

 BLOOD BANK  Antibody Scrn  Negative      Normal    [facility]



 RESULTS    (2011 14:35:00) ??          Medical



               Center



               



               



               

 

 BLOOD BANK  ABO/Rh  O POS      Unknown    [facility]



 RESULTS              [facility]



               



               



               

 

 CHEMISTRY  Creatinine  0.7  0.5 - 1.4    Normal     [place]



   Lvl            [facility]



               



               



               

 

 CHEMISTRY  Calcium Lvl  10.1  8.5 - 10.5    Normal     [place]



         /2011      [facility]



               



               



               

 

 CHEMISTRY  BUN  20.0  7 - 22    Normal     [place]



         /2011      [facility]



               



               



               

 

 CHEMISTRY  CO2  26.0  24 - 32    Normal     [place]



         /2011      [facility]



               



               



               

 

 CHEMISTRY  Glucose Lvl  90.0      NA  <sup>6</sup>I   [place]



         /2011    nterpretive  Medical



             Data:  Center



             Reference  



             Ranges :  



             0 - 7 days  :  



             41 - 90 mg/dL  



             7 days - 150  



             yrs : 70 - 99  



             mg/dL  



             (fasting),  



             based on the  



             clinical  



             recommendatio  



             ns of the  



             [employer]  



             Diabetes  



             Association.  



               



               

 

 CHEMISTRY  Chloride Lvl  102.0  95 - 109    Normal     [place]



         /2011      [facility]



               



               



               

 

 CHEMISTRY  Potassium Lvl  3.9  3.5 - 5.1    Normal     [place]



         /2011      [facility]



               



               



               

 

 CHEMISTRY  Sodium Lvl  141.0  135 - 145    Normal     [place]



         /2011      [facility]



               



               



               

 

 CHEMISTRY  AGAP  16.9  10.0 -    Normal     [place]



       20.0        [facility]



               



               



               

 

 HEMATOLOGY  Basophils #  0.0  0.0 - 0.2    Normal     [place]



         /2011      [facility]



               



               



               

 

 HEMATOLOGY  Basophils  0.6  0.0 - 1.0    Normal     [place]



         /2011      [facility]



               



               



               

 

 HEMATOLOGY  Segs-Bands #  4.5  1.5 - 8.1    Normal     [place]



         /2011      [facility]



               



               



               

 

 HEMATOLOGY  Eosinophils #  0.1  0.0 - 0.5    Normal     [place]



         /2011      [facility]



               



               



               

 

 HEMATOLOGY  Lymphocytes #  1.9  1.0 - 5.5    Normal     [place]



         /2011      [facility]



               



               



               

 

 HEMATOLOGY  Monocytes #  0.4  0.0 - 0.8    Normal     [place]



         /2011      [facility]



               



               



               

 

 HEMATOLOGY  Monocytes  5.7  2.0 - 12.0    Normal     [place]



         /2011      [facility]



               



               



               

 

 HEMATOLOGY  Segs  64.8  45.0 -    Normal     [place]



       75.0  /      [facility]



               



               



               

 

 HEMATOLOGY  Eosinophils  1.5  0.0 - 4.0    Normal     [place]



         /      [facility]



               



               



               

 

 HEMATOLOGY  Lymphocytes  27.4  20.0 -  11  Normal     [place]



       40.0  /      [facility]



               



               



               

 

 HEMATOLOGY  WBC  7.0  3.7 - 10.4    Normal     [place]



         /2011      [facility]



               



               



               

 

 HEMATOLOGY  MCV  93.1  81.0 -    Normal    [facility]



       99.0  /      [facility]



               



               



               

 

 HEMATOLOGY  MCH  31.3  27.0 -  11  HI     [place]



       31.0  /      [facility]



               



               



               

 

 HEMATOLOGY  RBC  4.29  4.20 -    Normal    [facility]



       5.40  /      [facility]



               



               



               

 

 HEMATOLOGY  Hct  40.0  36.0 -    Normal    [facility]



       48.0  /      [facility]



               



               



               

 

 HEMATOLOGY  Hgb  13.4  12.0 -    Normal     [place]



       16.0  /      [facility]



               



               



               

 

 HEMATOLOGY  MCHC  33.6  32.0 -    Normal    [facility]



       36.0  /      [facility]



               



               



               

 

 HEMATOLOGY  MPV  9.6  7.4 - 10.4    Normal     [place]



         /      [facility]



               



               



               

 

 HEMATOLOGY  RDW  12.9  11.5 -    Normal    [facility]



       14.5  /      [facility]



               



               



               

 

 HEMATOLOGY  Platelet  235.0  133 - 450    Normal     [place]



         /      [facility]



               



               



               

 

 HEMATOLOGY  PTT  31.5  22.9 -    Normal  <sup>12</sup>  [facility] [place]



       35.8  /    Interpretive  Medical



             Data: [facility]  Center



             Therapeutic  



             Range:  57 -  



             92 Seconds  



               



               

 

 HEMATOLOGY  PT  13.2  12.0 -    Normal    [facility]



       14.7        [facility]



               



               



               

 

 HEMATOLOGY  INR  1.0  0.85 -    Normal  <sup>9</sup>I  [facility] [place]



       1.17      nterpretive  Medical



             Data:  Center



             RECOMMENDED  



             RANGES FOR  



             PROTIME INR:  



               2.0-3.0 for  



             most medical  



             and surgical  



             thromboemboli  



             c states.  



             2.5-3.5 for  



             artificial  



             heart valves  



             and recurrent  



             embolism.  



             INR SHOULD BE  



             USED ONLY FOR  



             PATIENTS ON  



             STABLE  



             ANTICOAGULANT  



             THERAPY.  



               



               







Pathology Reports







 No Data Provided for This Section







Diagnostic Reports







 Report  Value  Date  Source



       

 

 Hip 1 view DX  Study: Hip 1 view DX 2019 10:03 AM CST  2019  
[facility] [place] [facility]



   Clinical Indication:  - post op hip xray;    



   Comparison: Intraoperative left hip x-ray same day at 0854 hours    



   FINDINGS: Single AP view of the pelvis and left hip are obtained. Pelvic and 
obturator rings are intact with normal limited pubic symphysis and sacroiliac 
joints. [facility] calcified vascular phlebo    



   liths. Status post post left total hip arthroplasty which appears 
anatomically aligned without evidence for periprosthetic fracture. Expected 
surrounding postsurgical soft tissue gas collection.    



   SL: SIMI    



       

 

 Hip 1 view bilateral  Study: Hip 1 view bilateral DX 2019 7:27 AM CST    [facility] [place] [facility]



 DX  Clinical Indication:  - LEFT TOTAL HIP ARTHROPLASTY;    



   Comparison: None.    



   FINDINGS: Intraoperative AP view of the left hip on 2 images for 
localization purposes. Left acetabular cup and femoral component appear in 
anatomic alignment.    



   SL: SIMI    



       

 

 Brain wo contrast CT  Clinical Indication:  - R51   Headache. 78-year-old 
female with a headache and soreness at the base of the skull. Patient has a 
history of an aneurysm.  2018  [facility] [place] [facility]



   Comparison: CTA brain 2011    



   TECHNIQUE: CT images were obtained from the foramen magnum to the vertex 
without the use of intravenous contrast on a multidetector CT. Coronal and 
sagittal reconstructions were obtained.    



   CT radiation dose DLP: 1179 mGy-cm    



   FINDINGS:    



   BRAIN PARENCHYMA:    



   A cluster of coils measures 16 mm x 14 mm x 13 mm is consistent with 
treatment of the right posterior communicating artery aneurysm.    



   There is generalized brain parenchymal atrophy related to the patient's age. 
Mild nonspecific periventricular white matter disease changes are noted. 
Atherosclerotic calcifications are present    



   within the carotid siphons and distal vertebral arteries.  There are no 
focal mass lesions on this noncontrast head CT. There is no mass effect, 
midline shift or edema. There are no intra-axial or extra    



   -axial fluid collections, intraventricular or intraparenchymal hemorrhage. 
There is no noncontrast CT evidence of a subacute stroke.    



   The pineal, sellar, brainstem, cerebellum and skull base regions appear 
unremarkable.    



   VENTRICLES: The lateral ventricles, third and fourth ventricles are normal 
for age. The basilar cisterns are normal.    



   ORBITS, MASTOIDS AND PARANASAL SINUSES: The visualized orbits are 
unremarkable. An 11 mm x 5 mm soft tissue focus is seen posteriorly in the left 
maxillary sinus and another is seen along the anterior w    



   all that measures 15 mm x 7 mm consistent with polyps or retention cysts. 
The remainder of the paranasal sinuses are relatively clear. The mastoid air 
cells are clear.    



   SKULL: There are no calvarial abnormalities seen.    



   If there is further concern for intracranial pathology or acute stroke, MRI 
of the brain may be performed for complete assessment.    



   IMPRESSION:    



   1. Cluster of coils is seen consistent with the treatment of the right 
posterior communicating artery aneurysm seen on the arteriogram May 3, 2012.    



   2. Chronic age-related and small vessel ischemic changes without mass, 
hemorrhage or subacute stroke.    



   SL:  [id number]    



       







Consultation Notes







 No Data Provided for This Section







Discharge Summaries







 No Data Provided for This Section







History and Physicals







 No Data Provided for This Section







Vital Signs







 Vital Sign  Value  Date  Comments  Source



         

 

 Respitory Rate  14  2019    [facility]



         

 

 Systolic (mm Hg)  131  2019    [facility]



         

 

 Diastolic (mm Hg)  79  2019    [facility]



         

 

 Respitory Rate  17  2019    [facility]



         

 

 Systolic (mm Hg)  136  2019    [facility]



         

 

 Diastolic (mm Hg)  78  2019    [facility]



         

 

 Respitory Rate  14  2019    [facility]



         

 

 Systolic (mm Hg)  127  2019    [facility]



         

 

 Diastolic (mm Hg)  68  2019    [facility]



         

 

 Heart Rate  69  2019    [facility]



         

 

 Temperature Oral (F)  98 F  2019    [facility]



         

 

 Heart Rate  72  2019    [facility]



         

 

 Height  160.02 cm  2019    [facility]



         

 

 BMI Calculated  34.21  2019    [facility]



         

 

 Weight  87.6  2019    [facility]



         

 

 BMI Calculated  32.77  2018    [facility]



         



         

 

 Height  162.56 cm  2018    [facility]



         



         

 

 Weight  86.591  2018    [facility]



         



         

 

 Heart Rate  67  2018    [facility]



         



         

 

 Systolic (mm Hg)  163  2018    [facility]



         



         

 

 Diastolic (mm Hg)  98  2018    [facility]



         



         

 

 Respitory Rate  15  08/10/2017    [facility]



         

 

 Systolic (mm Hg)  151  08/10/2017    [facility]



         

 

 Diastolic (mm Hg)  81  08/10/2017    [facility]



         

 

 Respitory Rate  20  08/10/2017    [facility]



         

 

 Systolic (mm Hg)  150  08/10/2017    [facility]



         

 

 Diastolic (mm Hg)  83  08/10/2017    [facility]



         

 

 Systolic (mm Hg)  144  08/10/2017    [facility]



         Hospital



         

 

 Diastolic (mm Hg)  62  08/10/2017    [facility]



         Hospital



         

 

 Respitory Rate  14  08/10/2017    [facility]



         

 

 Heart Rate  61  08/10/2017    [facility]



         

 

 Weight  84.091  2017    [facility]



         

 

 BMI Calculated  31.82  2017    [facility]



         

 

 Height  162.56 cm  2017    [facility]



         

 

 Systolic (mm Hg)  137  2012    [facility]



         

 

 Diastolic (mm Hg)  72  2012    [facility]



         

 

 Heart Rate  71  2012    [facility]



         

 

 Respitory Rate  20  2012    [facility]



         

 

 Weight  80.455  2012    [facility]



         

 

 Height  162.56 cm  2012    [facility]



         

 

 Systolic (mm Hg)  95.0  2011    [facility]



         

 

 Diastolic (mm Hg)  48.0  2011    [facility]



         

 

 Respitory Rate  13.0  2011    [facility]



         

 

 Diastolic (mm Hg)  51.0  2011    [facility]



         

 

 Systolic (mm Hg)  105.0  2011    [facility]



         

 

 Respitory Rate  18.0  2011    [facility]



         

 

 Temperature Oral (F)  99.0 F  2011    [facility]



         

 

 Respitory Rate  12.0  2011    [facility]



         

 

 Systolic (mm Hg)  104.0  2011    [facility]



         

 

 Diastolic (mm Hg)  56.0  2011    [facility]



         

 

 Temperature Oral (F)  97.5 F  2011    [facility]



         

 

 Temperature Oral (F)  97.4 F  2011    [facility]



         

 

 Height  160.02 cm  2011    [facility]



         

 

 Weight  79.545  2011    [facility]



         

 

 Heart Rate  94.0  2011    [facility]



         

 

 Height  162.56 cm  2011    [facility]



         

 

 Weight  79.545  2011    [facility]



         







Encounters







 Location  Location  Encounter  Encounter  Reason  Attending  ADM  DC  Status  
Source



   Details  Type  Number  For  Provider  Date  Date    



         Visit          



                   



                   



                   

 

 [facility]    Inpatient  [id number]    BRIDGER MIRANDA    Discharg  [facility]        ed  Medical



 [facility] [place] [facility]  [id number]    BRIDGER MIRANDA    Discharg  [facility]      3      /2012  /2012  ed  Medical



 [facility]    Bedded  [id number]    Laverne  08/10  08/10    



 Júnior    Outpatient  5    Sta    [facility]                  



                   



                   

 

 [facility]    Phone  [phone]          [facility]



 Neurosurger    Message      Neuro



 y [place]                  



                   



                   



                   

 

     Outpatient  [id number]    JASPER      [facility]



       0          [place]



                   



                   



                   



                   

 

 [facility]    Outpatient  [id number]    Jasper      [facility]



 Neurosurger      0        Neuro



 [facility] [place]                  



                   



                   



                   

 

 [facility]    Outpt Diag  [id number]    Jasper       OP



 Outpatient    Services  1        Imaging



 Imaging -                  -



 [facility]



                   



                   

 

 [facility]    Phone  [phone]          [facility]



 Neuroscienc    Message      Neuro



 e [facility]    Inpatient  [id number]    Ty      



 Júnior      6    [place]    [facility]                  



                   



                   







Procedures







 Procedure  Code  Date  Perfomer  Comments  Source



           



           

 

 Brain  [id number]  05/10/2011    ANURYSM  [facility]



 operations<sup>1</sup          Hospital



 >          



           

 

 Brain  [id number]  05/10/2011    ANURYSM  [facility]



 operations<sup>1</sup          



 >          



           

 

 Brain  [id number]  05/10/2011    ANURYSM   [facility]



 operations<sup>1</sup          - [place]



 >          



           

 

 Cataract surgery  [id number]        [facility]



           

 

 Knee arthroplasty  [id number]        [facility]



           

 

 Cataract surgery  [id number]        [facility]



           



           

 

 Knee arthroplasty  [id number]        [facility]



           



           

 

 Cataract surgery  [id number]         [facility]



           - [place]



           



           

 

 Knee arthroplasty  [id number]         [facility]



           - [place]



           



           

 

 Carpal tunnel release  [id number]        [facility]



           

 

 Cholecystectomy  [id number]        [facility]



           







Assessment and Plan







 No Data Provided for This Section







Plan of Care







 No Data Provided for This Section







Social History







 Social History  Date  Source



     

 

 Social History TypeResponse  2019  [facility]



 Substance Abuse    



 Use: None.    



 Alcohol    



 Current, Type Wine.  Frequency: Daily.    



 Smoking Status    



 Former smoker; Type: Cigarettes; Exposure to Tobacco Smoke None; Cigarette 
Smoking Last 365 Days No; Reg Smoking Cessation Counseling No; Started [age] age: 
19.0; Stopped at age: 39;    



 entered on: 19    



     

 

 Social History TypeResponse  08/10/2017  [facility]



 Alcohol    



 Past    



 Smoking Status    



 Former smoker; Exposure to Tobacco Smoke None; Cigarette Smoking Last 365 Days 
No; Reg Smoking Cessation Counseling No    



 entered on: 18    



     

 

 Social History TypeResponse  08/10/2017  [facility] [place]



 Alcohol    



 Past    



 Smoking Status    



 Former smoker; Exposure to Tobacco Smoke None; Cigarette Smoking Last 365 Days 
No; Reg Smoking Cessation Counseling No    



 entered on: 18    



     







Family History







 No Data Provided for This Section







Advance Directives







 No Data Provided for This Section







Functional Status







 No Data Provided for This Section

## 2019-08-03 NOTE — XMS REPORT
Summary of Care: 18 - 18

 Created on:2068



Patient:KIM MEDINA

Sex:Female

:1940

External Reference #:[id number]





Demographics







 [address]  [address]



   [place] [address]-

 

 Home Phone  [phone]

 

 Email Address  [email or web address]

 

 Preferred Language  English

 

 Marital Status  

 

 Jewish Affiliation  None

 

 Race  White

 

 Additional Race(s)  Unavailable

 

 Ethnic Group  Not  or 









Author







 Organization  [facility] [place]

 

 Address  [address]



   [place] [address]-

 

 Phone  [phone]









Encounter

HQ Jordin_ericka(FIN) [id number] Date(s): 18 - 18

[facility] [place] [address] [place] [address]- [phone]

Discharge Disposition: Home or Self Care

Attending Physician: Bryon Lehman MD





Vital Signs

No data available for this section



Problem List







 Condition  Effective Dates  Status  Health Status  Informant

 

 Knee joint pain(Confirmed)  < 2/10/05  Resolved    

 

 Benign essential hypertension1  09  Active    

 

 Cerebral angiogram(Confirmed)    Active    

 

 Degenerative joint disease involving  07  Active    



 multiple joints2        

 

 Dermatitis3  11/10/08  Active    

 

 Dyslipidemia4    Active    

 

 Hypertension(Confirmed)    Active    

 

 Brain aneurysm(Confirmed)  < 11  Resolved    

 

 Neck pain(Confirmed)    Active    

 

 Nonruptured cerebral aneurysm5    Active    

 

 Operative procedure on cerebral  11  Active    



 aneurysm(Confirmed)        

 

 Overweight6  11/10/08  Active    

 

 Simple obesity(Confirmed)    Active    



1Data migrated from [employer] on 5/30/15.2Data migrated from [employer] 
on 5/30/15.3Data migrated from [employer] on 5/30/15.4Data migrated from [employer] 
[employer] on 5/30/15.5Data migrated from [employer] on 8/25/15.6Data 
migrated from [employer] on 5/30/15.



Allergies, Adverse Reactions, Alerts







 Substance  Reaction  Severity  Status

 

 NKDA      Active







Medications

No data available for this section



Results

No data available for this section



Immunizations

No data available for this section



Procedures







 Procedure  Date  Related Diagnosis  Body Site  Status

 

 Brain operations1  5/10/11      Completed

 

 Cataract surgery        Completed

 

 Knee arthroplasty        Completed



1ANURYSM



Social History







 Social History Type  Response

 

 Alcohol  Past

 

 Smoking Status  Former smoker; Exposure to Tobacco Smoke None; Cigarette 
Smoking Last 365 Days No; Reg Smoking Cessation Counseling No



   entered on: 18







Assessment and Plan

No data available for this section

## 2019-08-03 NOTE — XMS REPORT
Summary of Care: 18 - 18

 Created on:2048



Patient:KIM MEDINA

Sex:Female

:1940

External Reference #:[id number]





Demographics







 Address  [address] [place] [address]-

 

 Home Phone  [phone]

 

 Email Address  [email or web address]

 

 Preferred Language  English

 

 Marital Status  

 

 Sabianist Affiliation  None

 

 Race  White

 

 Additional Race(s)  Unavailable

 

 Ethnic Group  Not  or 









Author







 Organization  [facility]

 

 Address  [address], [address]



   [place] [address]-

 

 Phone  [phone]









Encounter

HQ Encntr_alias(FIN) [id number] Date(s): 18 - 18

[facility] [address], [address] [place] 
[address]      [phone]





Vital Signs

No data available for this section



Problem List







 Condition  Effective Dates  Status  Health Status  Informant

 

 Knee joint pain(Confirmed)  < 2/10/05  Resolved    

 

 Benign essential hypertension1  09  Active    

 

 Cerebral angiogram(Confirmed)    Active    

 

 Degenerative joint disease involving  07  Active    



 multiple joints2        

 

 Dermatitis3  11/10/08  Active    

 

 Dyslipidemia4    Active    

 

 Hypertension(Confirmed)    Active    

 

 Brain aneurysm(Confirmed)  < 11  Resolved    

 

 Neck pain(Confirmed)    Active    

 

 Nonruptured cerebral aneurysm5    Active    

 

 Operative procedure on cerebral  11  Active    



 aneurysm(Confirmed)        

 

 Overweight6  11/10/08  Active    

 

 Simple obesity(Confirmed)    Active    



1Data migrated from [employer] on 5/30/15.2Data migrated from [employer] 
on 5/30/15.3Data migrated from [employer] on 5/30/15.4Data migrated from [employer] 
[employer] on 5/30/15.5Data migrated from [employer] on 8/25/15.6Data 
migrated from [employer] on 5/30/15.



Allergies, Adverse Reactions, Alerts







 Substance  Reaction  Severity  Status

 

 NKDA      Active







Medications

No data available for this section



Results

No data available for this section



Immunizations

No data available for this section



Procedures







 Procedure  Date  Related Diagnosis  Body Site  Status

 

 Brain operations1  5/10/11      Completed

 

 Cataract surgery        Completed

 

 Knee arthroplasty        Completed



1ANURYSM



Social History







 Social History Type  Response

 

 Alcohol  Past

 

 Smoking Status  Former smoker; Exposure to Tobacco Smoke None; Cigarette 
Smoking Last 365 Days No; Reg Smoking Cessation Counseling No



   entered on: 18







Assessment and Plan

No data available for this section

## 2019-08-04 LAB
BUN BLD-MCNC: 36 MG/DL (ref 7–18)
GLUCOSE SERPLBLD-MCNC: 120 MG/DL (ref 74–106)
MAGNESIUM SERPL-MCNC: 2.1 MG/DL (ref 1.8–2.4)
POTASSIUM SERPL-SCNC: 3.7 MMOL/L (ref 3.5–5.1)
TROPONIN (EMERG DEPT USE ONLY): < 0.02 NG/ML (ref 0–0.04)

## 2019-08-04 NOTE — RAD REPORT
EXAM DESCRIPTION:  RAD - Chest Single View - 8/3/2019 11:38 pm

 

CLINICAL HISTORY:  TIA, Stroke protocol chest film

 

COMPARISON:  None.

 

TECHNIQUE:  AP portable chest image was obtained 2334 hours .

 

FINDINGS:  No focal mass or consolidation. Interstitial markings are mildly prominent believed be bas
osmany. Failure and volume overload are not suspected.

 

 Heart size is upper normal. Vasculature within normal limits. Trachea is midline. No measurable pleu
ral effusion and no pneumothorax. No acute bony abnormality seen. No acute aortic findings suspected.


 

IMPRESSION:  No acute cardiopulmonary process.

## 2019-08-04 NOTE — EDPHYS
Physician Documentation                                                                           

 [facility]                                                                 

Name: Leah Henry                                                                                 

Age: 79 yrs                                                                                       

Sex: Female                                                                                       

: 1940                                                                                   

MRN: [id number]                                                                                   

Arrival Date: 2019                                                                          

Time: 23:07                                                                                       

Account#: [id number]                                                                            

Bed 3                                                                                             

Private MD:                                                                                       

ED Physician Levon Andre                                                                      

HPI:                                                                                              

                                                                                             

00:38 This 79 yrs old  Female presents to ER via Wheelchair with complaints of S/S   jr8 

      of Possible Stroke.                                                                         

00:38 The patient's problem is reported as altered mental status, disoriented to confused,    jr8 

      dysphasia, incoherent speech. Onset: The symptoms/episode began/occurred acutely,           

      today. Duration: This was a single incident. Context: the episode(s) was witnessed, by      

      family, symptoms became apparent at 20:00. occurred at a friend's home, occurred while      

      the patient was at rest. The symptoms are alleviated by nothing. The symptoms are           

      aggravated by nothing. Associated signs and symptoms: The patient has no apparent           

      associated signs or symptoms. Severity of symptoms: At their worst the symptoms were        

      moderate in the emergency department the symptoms have resolved. Patient's baseline:        

      Neuro: alert and fully oriented, Motor: no deficits, Ambulation: walks without              

      assistance, Speech: normal. The patient has not experienced similar symptoms in the         

      past. The patient has not recently seen a physician.                                        

                                                                                                  

Historical:                                                                                       

- Allergies:                                                                                      

                                                                                             

23:28 No Known Allergies;                                                                     fc  

- Home Meds:                                                                                      

23:28 losartan-hydrochlorothiazide 100-25 mg oral tab 1 tab once daily [Active]; Bystolic 20  fc  

      mg oral tab 1 tab once daily [Active]; chlorthalidone 25 mg Oral tab 1 tab once daily       

      [Active];                                                                                   

- PMHx:                                                                                           

23:28 Hypertension; brain anuersym;                                                           fc  

- PSHx:                                                                                           

23:28 Cholecystectomy; Knee surgery; hip surg; Carpal Tunnel Repair; Brain coil;              fc  

                                                                                                  

- Immunization history:: Last tetanus immunization: unknown.                                      

- Social history:: Smoking status: Patient/guardian denies using tobacco, Patient uses            

  alcohol, on a daily basis. Wine.                                                                

- Ebola Screening: : Patient negative for fever greater than or equal to 101.5 degrees            

  Fahrenheit, and additional compatible Ebola Virus Disease symptoms Patient denies               

  exposure to infectious person Patient denies travel to an Ebola-affected area in the            

  21 days before illness onset.                                                                   

                                                                                                  

                                                                                                  

ROS:                                                                                              

                                                                                             

00:38 Eyes: Negative for injury, pain, redness, and discharge, ENT: Negative for injury,      jr8 

      pain, and discharge, Neck: Negative for injury, pain, and swelling, Cardiovascular:         

      Negative for chest pain, palpitations, and edema, Respiratory: Negative for shortness       

      of breath, cough, wheezing, and pleuritic chest pain, Abdomen/GI: Negative for              

      abdominal pain, nausea, vomiting, diarrhea, and constipation, Back: Negative for injury     

      and pain, MS/Extremity: Negative for injury and deformity, Skin: Negative for injury,       

      rash, and discoloration.                                                                    

      Neuro: Positive for altered mental status, speech changes.                                  

                                                                                                  

Exam:                                                                                             

00:38 Radiologist reports: coils present. Mild artifact. Otherwise no apparent acute findings jr8 

                                                                                                  

00:38 Head/Face:  Normocephalic, atraumatic. Eyes:  Pupils equal round and reactive to light,     

      extra-ocular motions intact.  Lids and lashes normal.  Conjunctiva and sclera are           

      non-icteric and not injected.  Cornea within normal limits.  Periorbital areas with no      

      swelling, redness, or edema. ENT:  Nares patent. No nasal discharge, no septal              

      abnormalities noted.  Tympanic membranes are normal and external auditory canals are        

      clear.  Oropharynx with no redness, swelling, or masses, exudates, or evidence of           

      obstruction, uvula midline.  Mucous membranes moist. Neck:  Trachea midline, no             

      thyromegaly or masses palpated, and no cervical lymphadenopathy.  Supple, full range of     

      motion without nuchal rigidity, or vertebral point tenderness.  No Meningismus.             

      Chest/axilla:  Normal chest wall appearance and motion.  Nontender with no deformity.       

      No lesions are appreciated. Cardiovascular:  Regular rate and rhythm with a normal S1       

      and S2.  No gallops, murmurs, or rubs.  Normal PMI, no JVD.  No pulse deficits.             

      Respiratory:  Lungs have equal breath sounds bilaterally, clear to auscultation and         

      percussion.  No rales, rhonchi or wheezes noted.  No increased work of breathing, no        

      retractions or nasal flaring. Abdomen/GI:  Soft, non-tender, with normal bowel sounds.      

      No distension or tympany.  No guarding or rebound.  No evidence of tenderness               

      throughout. Back:  No spinal tenderness.  No costovertebral tenderness.  Full range of      

      motion. Skin:  Warm, dry with normal turgor.  Normal color with no rashes, no lesions,      

      and no evidence of cellulitis. MS/ Extremity:  Pulses equal, no cyanosis.                   

      Neurovascular intact.  Full, normal range of motion. Neuro:  Awake and alert, GCS 15,       

      oriented to person, place, time, and situation.  Cranial nerves II-XII grossly intact.      

      Motor strength 5/5 in all extremities.  Sensory grossly intact.  Cerebellar exam            

      normal.  Normal gait.                                                                       

                                                                                                  

Vital Signs:                                                                                      

                                                                                             

23:03  / 74; Pulse 85; Resp 18; Temp 98.3(O); Pulse Ox 98% on R/A; Weight 83.91 kg (R); fc  

      Height 5 ft. 4 in. (162.56 cm) (R); Pain 4/10;                                              

23:27  / 68; Pulse 85; Resp 14; Pulse Ox 100% on R/A;                                   tl2 

                                                                                             

00:11  / 65; Pulse 69; Resp 18; Pulse Ox 98% on R/A;                                    tl2 

00:55  / 72; Pulse 76; Resp 19; Pulse Ox 99% on R/A;                                    tl2 

                                                                                             

23:03 Body Mass Index 31.75 (83.91 kg, 162.56 cm)                                               

                                                                                                  

NIH Stroke Scale Scores:                                                                          

                                                                                             

23:10 NIHSS Score: 0                                                                            

23:29 NIHSS Score: 0                                                                          tl2 

                                                                                             

00:38 NIHSS Score: 0                                                                          [facility] 

                                                                                                  

MDM:                                                                                              

                                                                                             

23:17 Patient medically screened.                                                             8 

                                                                                             

00:38 Data reviewed: vital signs, nurses notes, lab test result(s), EKG, radiologic studies,  jr8 

      CT scan, plain films. Data interpreted: Pulse oximetry: on room air is 98 %.                

      Interpretation: normal. Counseling: I had a detailed discussion with the patient and/or     

      guardian regarding: the historical points, exam findings, and any diagnostic results        

      supporting the discharge/admit diagnosis, lab results, radiology results, the need for      

      outpatient follow up, a cardiologist, a neurologist, to return to the emergency             

      department if symptoms worsen or persist or if there are any questions or concerns that     

      arise at home. ED course: Patient with complete resolution of symptoms. TIA suspected.      

      No other acute abnormality noted. Started patient on aspirin. Due to resolved symptoms.     

      No tPA indicated .                                                                          

01:00 ED course: Patient doing well. No focal deficits at this time. Remains stable and       jr8 

      asymptomatic. Started patient on aspirin. Will continue to be on low dose daily from        

      now on or until neurology says otherwise. Patient will f/u with her personal                

      neurologist on Monday in [place]. If worse or is s/s happen again will go to her            

      nearest hospital.                                                                           

                                                                                                  

                                                                                             

23:17 Order name: Magnesium; Complete Time: 00:04                                                                                                                                          

23:17 Order name: Troponin (emerg Dept Use Only); Complete Time: 00:04                                                                                                                     

23:17 Order name: Basic Metabolic Panel; Complete Time: 00:04                                                                                                                              

23:17 Order name: CBC with Diff; Complete Time: 23:53                                                                                                                                      

23:17 Order name: Protime (+inr); Complete Time: 00:04                                                                                                                                     

23:17 Order name: Ptt, Activated; Complete Time: 00:04                                                                                                                                     

23:17 Order name: CT Stroke Brain w/o Contrast                                                 

                                                                                             

23:17 Order name: Stroke CXR 1 View                                                                                                                                                        

23:17 Order name: EKG; Complete Time: 23:19                                                                                                                                                

23:17 Order name: Accucheck; Complete Time: 23:50                                                                                                                                          

23:17 Order name: Cardiac monitoring; Complete Time: 23:50                                                                                                                                 

23:17 Order name: EKG - Nurse/Tech; Complete Time: 23:50                                                                                                                                   

23:17 Order name: IV Saline Lock; Complete Time: 23:50                                                                                                                                     

23:17 Order name: Labs collected and sent; Complete Time: 23:50                                                                                                                            

23:17 Order name: NPO; Complete Time: 23:50                                                                                                                                                

23:17 Order name: O2 Per Protocol; Complete Time: 23:50                                                                                                                                    

23:17 Order name: O2 Sat Monitoring; Complete Time: 23:50                                                                                                                                  

23:17 Order name: Stroke Swallow Screen; Complete Time: 23:50                                  

                                                                                                  

Administered Medications:                                                                         

                                                                                             

23:57 Drug: Aspirin Chewable Tablet 324 mg Route: PO;                                         tl2 

                                                                                             

00:00 Follow up: Response: No adverse reaction                                                tl2 

                                                                                                  

                                                                                                  

Point of Care Testing:                                                                            

      Blood Glucose:                                                                              

                                                                                             

23:11 Blood Glucose: 125 mg/dL;                                                               fc  

      Ranges:                                                                                     

      Critical Glucose Levels:Adult <50 mg/dl or >400 mg/dl  <40 mg/dl or >180 mg/dl       

Disposition:                                                                                      

19 01:02 Discharged to Home. Impression: Transient cerebral ischemic attack, unspecified.   

- Condition is Stable.                                                                            

- Discharge Instructions: Stroke Prevention, Transient Ischemic Attack, Aspirin and               

  Your Heart.                                                                                     

                                                                                                  

- Medication Reconciliation Form, Thank You Letter, Antibiotic Education, Prescription            

  Opioid Use form.                                                                                

- Follow up: Private Physician; When: 1 - 2 days; Reason: Recheck today's complaints,             

  Continuance of care, Re-evaluation by your physician.                                           

- Problem is new.                                                                                 

- Symptoms are resolved.                                                                          

                                                                                                  

                                                                                                  

                                                                                                  

                                                                                                  

NIH Stroke Scale - NIH Stroke Score                                                               

Date: 2019                                                                                  

Time: 23:10                                                                                       

Total Score = 0                                                                                   

  1a. Level of Consciousness (LOC) - 0(Alert)                                                     

  1b. Level of Consciousness (LOC) (Year \T\ Age) - 0(Both)                                       

  1c. LOC Commands (Open \T\ Closes Eyes/) - 0(Both)                                          

   2. Best Gaze (Lateral Gaze Paresis) - 0(Normal)                                                

   3. Visual Field Loss - 0(No visual loss)                                                       

   4. Facial Palsy - 0(Normal)                                                                    

  5a. Left Arm: Motor (10-second hold) - 0(No drift)                                              

  5b. Right Arm: Motor (10-second hold) - 0(No drift)                                             

  6a. Left Leg: Motor (5-second hold - always test supine) - 0(No drift)                          

  6b. Right Leg: Motor (5-second hold - always test supine) - 0(No drift)                         

   7. Limb Ataxia (finger/nose \T\ heel/shin - test with eyes open) - 0(Absent)                   

   8. Sensory Loss (pinprick arms/legs/face) - 0(Normal)                                          

   9. Best Language: Aphasia (description/naming/reading) - 0(No aphasia)                         

  10. Dysarthria (speech clarity - read or repeat words) - 0(Normal)                              

  11. Extinction and Inattention (visual/tactile/auditory/spatial/personal) - 0(No                

      abnormality)                                                                                

Initials:                                                                                       

                                                                                                  

                                                                                                  

NIH Stroke Scale - NIH Stroke Score                                                               

Date: 2019                                                                                  

Time: 23:29                                                                                       

Total Score = 0                                                                                   

  1a. Level of Consciousness (LOC) - 0(Alert)                                                     

  1b. Level of Consciousness (LOC) (Year \T\ Age) - 0(Both)                                       

  1c. LOC Commands (Open \T\ Closes Eyes/) - 0(Both)                                          

   2. Best Gaze (Lateral Gaze Paresis) - 0(Normal)                                                

   3. Visual Field Loss - 0(No visual loss)                                                       

   4. Facial Palsy - 0(Normal)                                                                    

  5a. Left Arm: Motor (10-second hold) - 0(No drift)                                              

  5b. Right Arm: Motor (10-second hold) - 0(No drift)                                             

  6a. Left Leg: Motor (5-second hold - always test supine) - 0(No drift)                          

  6b. Right Leg: Motor (5-second hold - always test supine) - 0(No drift)                         

   7. Limb Ataxia (finger/nose \T\ heel/shin - test with eyes open) - 0(Absent)                   

   8. Sensory Loss (pinprick arms/legs/face) - 0(Normal)                                          

   9. Best Language: Aphasia (description/naming/reading) - 0(No aphasia)                         

  10. Dysarthria (speech clarity - read or repeat words) - 0(Normal)                              

  11. Extinction and Inattention (visual/tactile/auditory/spatial/personal) - 0(No                

      abnormality)                                                                                

Initials: tl2                                                                                     

                                                                                                  

                                                                                                  

NIH Stroke Scale - NIH Stroke Score                                                               

Date: 2019                                                                                  

Time: 00:38                                                                                       

Total Score = 0                                                                                   

  1a. Level of Consciousness (LOC) - 0(Alert)                                                     

  1b. Level of Consciousness (LOC) (Year \T\ Age) - 0(Both)                                       

  1c. LOC Commands (Open \T\ Closes Eyes/) - 0(Both)                                          

   2. Best Gaze (Lateral Gaze Paresis) - 0(Normal)                                                

   3. Visual Field Loss - 0(No visual loss)                                                       

   4. Facial Palsy - 0(Normal)                                                                    

  5a. Left Arm: Motor (10-second hold) - 0(No drift)                                              

  5b. Right Arm: Motor (10-second hold) - 0(No drift)                                             

  6a. Left Leg: Motor (5-second hold - always test supine) - 0(No drift)                          

  6b. Right Leg: Motor (5-second hold - always test supine) - 0(No drift)                         

   7. Limb Ataxia (finger/nose \T\ heel/shin - test with eyes open) - 0(Absent)                   

   8. Sensory Loss (pinprick arms/legs/face) - 0(Normal)                                          

   9. Best Language: Aphasia (description/naming/reading) - 0(No aphasia)                         

  10. Dysarthria (speech clarity - read or repeat words) - 0(Normal)                              

  11. Extinction and Inattention (visual/tactile/auditory/spatial/personal) - 0(No                

      abnormality)                                                                                

Initials: [email or web address]                                                                                     

                                                                                                  

Addendum:                                                                                         

2019                                                                                        

     09:23 Co-signature as Attending Physician, Levon Andre MD I agree with the         [facility]    

           assessment and plan of care.                                                           

                                                                                                  

Signatures:                                                                                       

Dispatcher [employer]                           EDLevon Cheek MD MD cha Chretien, Felicia RN                   RN                                                      

Naveed Smith PA PA   [email or web address]                                                  

Bisi Nolasco RN                        RN   tl2                                                  

                                                                                                  

Corrections: (The following items were deleted from the chart)                                    

                                                                                             

01:12 01:02 2019 01:02 Discharged to Home. Impression: Transient cerebral       tl2         

      ischemic attack, unspecified. Condition is Stable. Forms are Medication                     

      Reconciliation Form, Thank You Letter, Antibiotic Education, Prescription                   

      Opioid Use. Follow up: Private Physician; When: 1 - 2 days; Reason: Recheck                 

      today's complaints, Continuance of care, Re-evaluation by your physician.                   

      Problem is new. Symptoms are resolved. jr8                                                  

                                                                                                  

**************************************************************************************************

## 2019-08-05 NOTE — RAD REPORT
EXAM DESCRIPTION:  CT - Ct Stroke Brain Wo Cont - 8/3/2019 11:43 pm

 

CLINICAL HISTORY:  Confused;TIA

 

COMPARISON:  None Available.

 

TECHNIQUE:  Multiple helical axial tomographic images were obtained of the head without intravenous c
ontrast. This exam was performed according to our departmental dose-optimization program, which inclu
bala automated exposure control, adjustment of the mA and/or kV according to patient size and/or use o
f iterative reconstruction technique.

 

FINDINGS:  Embolization coil in the right suprasellar region noted. Streak artifact from the emboliza
tion coil mildly limits the evaluation of the brain at the level of the middle cranial fossa. There i
s no acute intracranial hemorrhage. No mass. No midline shift. No ventriculomegaly. Gray-white matter
 differentiation is maintained.

Paranasal sinuses are clear. Mastoid air cells and middle ear spaces are clear. Orbits and orbital co
ntents are unremarkable.

Osseous structures are unremarkable. Surrounding soft tissues are unremarkable.

 

IMPRESSION:  No acute intracranial process within the limits of the study.

THIS REPORT CONTAINS FINDINGS THAT MAY BE CRITICAL TO PATIENT CARE:  The findings were verbally discu
ssed via telephone conference with Dr. Smith by Dr. Kelly at 2338 hours central time on August 3, 2019
. The results were acknowledged and understood.

 

Electronically signed by:   Camron Kelly MD   8/3/2019 11:39 PM CDT Workstation: [id number]

 

 

Due to temporary technical issues with the PACS/Fluency reporting system, reports are being signed by
 the in house radiologist as a courtesy to ensure prompt reporting. The interpreting radiologist is f
ully responsible for the content of the report.

## 2019-08-05 NOTE — EKG
Test Date:    2019-08-03               Test Time:    23:27:20

Technician:   AER                                    

                                                     

MEASUREMENT RESULTS:                                       

Intervals:                                           

Rate:         71                                     

RI:           208                                    

QRSD:         88                                     

QT:           404                                    

QTc:          439                                    

Axis:                                                

P:            50                                     

RI:           208                                    

QRS:          -12                                    

T:            26                                     

                                                     

INTERPRETIVE STATEMENTS:                                       

                                                     

Normal sinus rhythm

Normal ECG

No previous ECG available for comparison



Electronically Signed On 08-05-19 07:45:02 CDT by Gabino Reddy

## 2019-11-26 NOTE — ER
Nurse's Notes                                                                                     

 [facility] – [place], [place]                                                                 

Name: Leah Henry                                                                                 

Age: 79 yrs                                                                                       

Sex: Female                                                                                       

: 1940                                                                                   

MRN: [id number]                                                                                   

Arrival Date: 2019                                                                          

Time: 23:07                                                                                       

Account#: [id number]                                                                            

Bed 3                                                                                             

Private MD:                                                                                       

Diagnosis: Transient cerebral ischemic attack, unspecified                                        

                                                                                                  

Presentation:                                                                                     

                                                                                             

23:03 Presenting complaint: Patient states: that she was visiting with friends and then all   fc  

      of a sudden could not remember their names and was not speaking correctly. No noted         

      weakness. Transition of care: patient was not received from another setting of care. No     

      acute neurological deficit is noted. Pre-hospital glucose is not applicable to this         

      patient. Onset of symptoms was 2019 at 20:00. Risk Assessment: Do you want       

      to hurt yourself or someone else? Patient reports no desire to harm self or others.         

      Initial Sepsis Screen: Does the patient meet any 2 criteria? No. Patient's initial          

      sepsis screen is negative. Does the patient have a suspected source of infection? No.       

      Patient's initial sepsis screen is negative. Care prior to arrival: None.                   

23:03 Method Of Arrival: Wheelchair                                                           fc  

23:03 Acuity: DIMITRY 2                                                                           fc  

                                                                                                  

Triage Assessment:                                                                                

23:03 The onset of the patients symptoms was 2019 at 20:00.                        fc  

23:10 Neuro: Reports Pt stated that she momentarily forgot her friends names and had          tl2 

      difficulty speaking. Symptoms have resolved at this time. Denies.                           

                                                                                                  

Stroke Activation: Symtpom onset >3 hours and < 6 hours                                           

 Physician: Stroke Attending; Name: ; Notified At: ; Arrived At:                                  

 Physician: Chief Stroke Resident; Name: ; Notified At: ; Arrived At:                             

 Physician: Stroke Resident; Name: ; Notified At: ; Arrived At:                                   

 Physician: ED Attending; Name: Thai Dsouza Md; Notified At:  23:07;              

  Arrived At:  23:07                                                                    

 Physician: ED Resident; Name: ; Notified At: ; Arrived At:                                       

                                                                                                  

Historical:                                                                                       

- Allergies:                                                                                      

23:28 No Known Allergies;                                                                     fc  

- Home Meds:                                                                                      

23:28 losartan-hydrochlorothiazide 100-25 mg oral tab 1 tab once daily [Active]; Bystolic 20  fc  

      mg oral tab 1 tab once daily [Active]; chlorthalidone 25 mg Oral tab 1 tab once daily       

      [Active];                                                                                   

- PMHx:                                                                                           

23:28 Hypertension; brain anuersym;                                                           fc  

- PSHx:                                                                                           

23:28 Cholecystectomy; Knee surgery; hip surg; Carpal Tunnel Repair; Brain coil;              fc  

                                                                                                  

- Immunization history:: Last tetanus immunization: unknown.                                      

- Social history:: Smoking status: Patient/guardian denies using tobacco, Patient uses            

  alcohol, on a daily basis. Wine.                                                                

- Ebola Screening: : Patient negative for fever greater than or equal to 101.5 degrees            

  Fahrenheit, and additional compatible Ebola Virus Disease symptoms Patient denies               

  exposure to infectious person Patient denies travel to an Ebola-affected area in the            

  21 days before illness onset.                                                                   

                                                                                                  

                                                                                                  

Screenin:03 Abuse screen: Denies threats or abuse. Nutritional screening: No deficits noted.          

      Tuberculosis screening: No symptoms or risk factors identified. Fall Risk None              

      identified.                                                                                 

23:27 The patient has not been NPO before screening. The patient is alert, able to follow     tl2 

      commands. The patient does not exhibit slurred or garbled speech The patient is not         

      exhibiting difficulty speaking. The patient does not exhibit difficulty understanding       

      words. The patient is able to swallow own secretions with no drooling or need for           

      suction. Patient tolerated one teaspoon of water. No drooling, immediate coughing,          

      gurgling, or clearing of the throat was noted. The patient tolerated 90mL of water. No      

      drooling, immediate coughing, gurgling, or clearing of the throat was noted. The            

      patient passed the bedside swallow screening. Oral medications may be given as ordered.     

      Contact Physician for further diet orders.                                                  

                                                                                                  

Assessment:                                                                                       

23:10 General: Appears in no apparent distress. uncomfortable, Behavior is calm, cooperative, tl2 

      appropriate for age. Pain: Denies pain. Neuro: Level of Consciousness is awake, alert,      

      obeys commands, Oriented to person, place, time, situation,  are equal bilaterally     

      Moves all extremities. Speech is normal, Facial symmetry appears normal, Pupils are         

      PERRLA, Intact Denies weakness blurred vision dizziness. Cardiovascular: Denies chest       

      pain. Respiratory: Airway is patent Respiratory effort is even, unlabored, Respiratory      

      pattern is regular, symmetrical. GI: No deficits noted. Derm: Skin is pink, warm \T\ dry.   

23:28 The patient has not been NPO before screening. The patient is alert, and able to follow tl2 

      commands. The patient does not exhibit slurred or garbled speech. The patient is not        

      exhibiting difficulty speaking. The patient does not exhibit difficulty understanding       

      words. The patient is able to swallow own secretions with no drooling or need for           

      suction. Patient tolerated one teaspoon of water. No drooling, immediate coughing,          

      gurgling, or clearing of the throat was noted. The patient tolerated 90mL of water. No      

      drooling, immediate coughing, gurgling, or clearing of the throat was noted. The            

      patient passed the bedside swallow screening. Oral medications may be given as ordered.     

      Contact Physician for further diet orders. Provider notified of bedside swallow             

      screening results: Naveed GORMAN.                                                          

23:29 VAN Scoring: Arm Drift: Patients demonstrates NO arm weakness. Patient is VAN Negative. tl2 

      Visual Disturbance: No visual disturbance noted. Aphasia: No aphasia noted. Neglect: No     

      neglect noted.                                                                              

                                                                                             

00:11 T-PA (Activase) Screening: Indications: Treatment will start within 4.5 hours onset of  tl2 

      symptoms: Yes.                                                                              

01:08 Reassessment: Patient appears in no apparent distress at this time. Patient and/or      tl2 

      family updated on plan of care and expected duration. Pain level reassessed. Patient is     

      alert, oriented x 3, equal unlabored respirations, skin warm/dry/pink. pt verbalized        

      understanding of discharge instructions, need for follow up.                                

                                                                                                  

Vital Signs:                                                                                      

                                                                                             

23:03  / 74; Pulse 85; Resp 18; Temp 98.3(O); Pulse Ox 98% on R/A; Weight 83.91 kg (R); fc  

      Height 5 ft. 4 in. (162.56 cm) (R); Pain 4/10;                                              

23:27  / 68; Pulse 85; Resp 14; Pulse Ox 100% on R/A;                                   tl2 

                                                                                             

00:11  / 65; Pulse 69; Resp 18; Pulse Ox 98% on R/A;                                    tl2 

00:55  / 72; Pulse 76; Resp 19; Pulse Ox 99% on R/A;                                    tl2 

                                                                                             

23:03 Body Mass Index 31.75 (83.91 kg, 162.56 cm)                                               

                                                                                                  

NIH Stroke Scale Scores:                                                                          

                                                                                             

23:10 NIHSS Score: 0                                                                            

23:29 NIHSS Score: 0                                                                          tl2 

                                                                                             

00:38 NIHSS Score: 0                                                                          jr8 

                                                                                                  

ED Course:                                                                                        

                                                                                             

23:03 Arm band placed on Patient placed in an exam room, on a stretcher.                      fc  

23:03 Patient has correct armband on for positive identification. Placed in gown. Bed in low  fc  

      position. Call light in reach. Side rails up X2. Cardiac monitor on. Pulse ox on. NIBP      

      on.                                                                                         

23:03 No provider procedures requiring assistance completed.                                  fc  

23:07 Patient arrived in ED.                                                                  es  

23:12 Inserted saline lock: 20 gauge in right antecubital area, using aseptic technique.      fc  

      ,using aseptic technique. per Lorraine PÉREZ Blood collected.                                     

23:17 Naveed Smith PA is PHCP.                                                               jr8 

23:17 Levon Andre MD is Attending Physician.                                             jr8 

23:21 Triage completed.                                                                       fc  

23:27 EKG done, by ED staff, reviewed by Naveed GORMAN.                                      fc  

23:30 X-ray(s) taken.                                                                         fc  

23:31 CT Stroke Brain w/o Contrast In Process Unspecified.                                    EDMS

23:34 X-ray completed. Portable x-ray completed in exam room. Patient tolerated procedure     mh1 

      well.                                                                                       

23:36 Stroke CXR 1 View In Process Unspecified.                                               EDMS

                                                                                             

00:55 Bisi Nolasco, RN is Primary Nurse.                                                      tl2 

01:08 IV discontinued, intact, bleeding controlled, No redness/swelling at site. Pressure     tl2 

      dressing applied.                                                                           

                                                                                                  

Administered Medications:                                                                         

                                                                                             

23:57 Drug: Aspirin Chewable Tablet 324 mg Route: PO;                                         tl2 

                                                                                             

00:00 Follow up: Response: No adverse reaction                                                tl2 

                                                                                                  

                                                                                                  

Point of Care Testing:                                                                            

      Blood Glucose:                                                                              

                                                                                             

23:11 Blood Glucose: 125 mg/dL;                                                                 

      Ranges:                                                                                     

                                                                                                  

Outcome:                                                                                          

                                                                                             

01:02 Discharge ordered by MD.                                                                jr8 

01:08 Discharged to home ambulatory, with family.                                             tl2 

01:08 Condition: stable                                                                           

01:08 Discharge instructions given to patient, family, Instructed on discharge instructions,      

      follow up and referral plans. Demonstrated understanding of instructions, follow-up         

      care.                                                                                       

01:12 Patient left the ED.                                                                    tl2 

                                                                                                  

                                                                                                  

NIH Stroke Scale - NIH Stroke Score                                                               

Date: 2019                                                                                  

Time: 23:10                                                                                       

Total Score = 0                                                                                   

  1a. Level of Consciousness (LOC) - 0(Alert)                                                     

  1b. Level of Consciousness (LOC) (Year \T\ Age) - 0(Both)                                       

  1c. LOC Commands (Open \T\ Closes Eyes/) - 0(Both)                                          

   2. Best Gaze (Lateral Gaze Paresis) - 0(Normal)                                                

   3. Visual Field Loss - 0(No visual loss)                                                       

   4. Facial Palsy - 0(Normal)                                                                    

  5a. Left Arm: Motor (10-second hold) - 0(No drift)                                              

  5b. Right Arm: Motor (10-second hold) - 0(No drift)                                             

  6a. Left Leg: Motor (5-second hold - always test supine) - 0(No drift)                          

  6b. Right Leg: Motor (5-second hold - always test supine) - 0(No drift)                         

   7. Limb Ataxia (finger/nose \T\ heel/shin - test with eyes open) - 0(Absent)                   

   8. Sensory Loss (pinprick arms/legs/face) - 0(Normal)                                          

   9. Best Language: Aphasia (description/naming/reading) - 0(No aphasia)                         

  10. Dysarthria (speech clarity - read or repeat words) - 0(Normal)                              

  11. Extinction and Inattention (visual/tactile/auditory/spatial/personal) - 0(No                

      abnormality)                                                                                

Initials:                                                                                       

                                                                                                  

                                                                                                  

NIH Stroke Scale - NIH Stroke Score                                                               

Date: 2019                                                                                  

Time: 23:29                                                                                       

Total Score = 0                                                                                   

  1a. Level of Consciousness (LOC) - 0(Alert)                                                     

  1b. Level of Consciousness (LOC) (Year \T\ Age) - 0(Both)                                       

  1c. LOC Commands (Open \T\ Closes Eyes/) - 0(Both)                                          

   2. Best Gaze (Lateral Gaze Paresis) - 0(Normal)                                                

   3. Visual Field Loss - 0(No visual loss)                                                       

   4. Facial Palsy - 0(Normal)                                                                    

  5a. Left Arm: Motor (10-second hold) - 0(No drift)                                              

  5b. Right Arm: Motor (10-second hold) - 0(No drift)                                             

  6a. Left Leg: Motor (5-second hold - always test supine) - 0(No drift)                          

  6b. Right Leg: Motor (5-second hold - always test supine) - 0(No drift)                         

   7. Limb Ataxia (finger/nose \T\ heel/shin - test with eyes open) - 0(Absent)                   

   8. Sensory Loss (pinprick arms/legs/face) - 0(Normal)                                          

   9. Best Language: Aphasia (description/naming/reading) - 0(No aphasia)                         

  10. Dysarthria (speech clarity - read or repeat words) - 0(Normal)                              

  11. Extinction and Inattention (visual/tactile/auditory/spatial/personal) - 0(No                

      abnormality)                                                                                

Initials: tl2                                                                                     

                                                                                                  

                                                                                                  

NIH Stroke Scale - NIH Stroke Score                                                               

Date: 2019                                                                                  

Time: 00:38                                                                                       

Total Score = 0                                                                                   

  1a. Level of Consciousness (LOC) - 0(Alert)                                                     

  1b. Level of Consciousness (LOC) (Year \T\ Age) - 0(Both)                                       

  1c. LOC Commands (Open \T\ Closes Eyes/) - 0(Both)                                          

   2. Best Gaze (Lateral Gaze Paresis) - 0(Normal)                                                

   3. Visual Field Loss - 0(No visual loss)                                                       

   4. Facial Palsy - 0(Normal)                                                                    

  5a. Left Arm: Motor (10-second hold) - 0(No drift)                                              

  5b. Right Arm: Motor (10-second hold) - 0(No drift)                                             

  6a. Left Leg: Motor (5-second hold - always test supine) - 0(No drift)                          

  6b. Right Leg: Motor (5-second hold - always test supine) - 0(No drift)                         

   7. Limb Ataxia (finger/nose \T\ heel/shin - test with eyes open) - 0(Absent)                   

   8. Sensory Loss (pinprick arms/legs/face) - 0(Normal)                                          

   9. Best Language: Aphasia (description/naming/reading) - 0(No aphasia)                         

  10. Dysarthria (speech clarity - read or repeat words) - 0(Normal)                              

  11. Extinction and Inattention (visual/tactile/auditory/spatial/personal) - 0(No                

      abnormality)                                                                                

Initials: jr8                                                                                     

                                                                                                  

Signatures:                                                                                       

Dispatcher [employer]                           Deirdre Zapata Martha                               1                                                  

Veronique Maier, RN                   RN   fc                                                   

Naveed Smith PA PA   [id number]                                                  

Bisi Nolasco, RN                        RN   [id number]                                                  

                                                                                                  

Corrections: (The following items were deleted from the chart)                                    

                                                                                             

23:25 23:03  / 74; Pulse 85bpm; Resp 18bpm; Pulse Ox 98% RA; 83.91 kg Reported; fc          

      Height 5 ft. 4 in. Reported; BMI: 31.7; Pain 4/10; fc                                       

                                                                                                  

**************************************************************************************************
Asa, Statin, B-blocker, Chest PT,  Incentive spirometry, wound care and assessment.  Ambulate   Shower pod #5
lt chest tube remains   now clamped  probable out in am if stable
lt chest tube remains   now clamped  probable out in am if stable